# Patient Record
Sex: FEMALE | Race: WHITE
[De-identification: names, ages, dates, MRNs, and addresses within clinical notes are randomized per-mention and may not be internally consistent; named-entity substitution may affect disease eponyms.]

---

## 2017-04-24 ENCOUNTER — HOSPITAL ENCOUNTER (OUTPATIENT)
Dept: HOSPITAL 53 - M LABDRAW1 | Age: 82
End: 2017-04-24
Attending: FAMILY MEDICINE
Payer: MEDICARE

## 2017-04-24 DIAGNOSIS — R05: Primary | ICD-10-CM

## 2017-04-24 LAB
ALBUMIN SERPL BCG-MCNC: 2.8 GM/DL (ref 3.2–5.2)
ALBUMIN/GLOB SERPL: 0.7 {RATIO} (ref 1–1.93)
ALP SERPL-CCNC: 111 U/L (ref 45–117)
ALT SERPL W P-5'-P-CCNC: 28 U/L (ref 12–78)
ANION GAP SERPL CALC-SCNC: 10 MEQ/L (ref 8–16)
AST SERPL-CCNC: 23 U/L (ref 15–37)
BASOPHILS # BLD AUTO: 0 K/MM3 (ref 0–0.2)
BASOPHILS NFR BLD AUTO: 0.2 % (ref 0–1)
BILIRUB SERPL-MCNC: 1.5 MG/DL (ref 0.2–1)
BUN SERPL-MCNC: 20 MG/DL (ref 7–18)
CALCIUM SERPL-MCNC: 8.3 MG/DL (ref 8.8–10.2)
CHLORIDE SERPL-SCNC: 93 MEQ/L (ref 98–107)
CO2 SERPL-SCNC: 27 MEQ/L (ref 21–32)
CREAT SERPL-MCNC: 0.58 MG/DL (ref 0.55–1.02)
EOSINOPHIL # BLD AUTO: 0 K/MM3 (ref 0–0.5)
EOSINOPHIL NFR BLD AUTO: 0.1 % (ref 0–3)
ERYTHROCYTE [DISTWIDTH] IN BLOOD BY AUTOMATED COUNT: 14.1 % (ref 11.5–14.5)
GFR SERPL CREATININE-BSD FRML MDRD: > 60 ML/MIN/{1.73_M2} (ref 32–?)
GLUCOSE SERPL-MCNC: 108 MG/DL (ref 83–110)
LARGE UNSTAINED CELL #: 0.1 K/MM3 (ref 0–0.4)
LARGE UNSTAINED CELL %: 0.6 % (ref 0–4)
LYMPHOCYTES # BLD AUTO: 1 K/MM3 (ref 1.5–4.5)
LYMPHOCYTES NFR BLD AUTO: 5.6 % (ref 24–44)
MCH RBC QN AUTO: 28.5 PG (ref 27–33)
MCHC RBC AUTO-ENTMCNC: 31.9 G/DL (ref 32–36.5)
MCV RBC AUTO: 89.1 FL (ref 80–96)
MONOCYTES # BLD AUTO: 1.1 K/MM3 (ref 0–0.8)
MONOCYTES NFR BLD AUTO: 6.4 % (ref 0–5)
NEUTROPHILS # BLD AUTO: 14.3 K/MM3 (ref 1.8–7.7)
NEUTROPHILS NFR BLD AUTO: 87.1 % (ref 36–66)
PLATELET # BLD AUTO: 321 K/MM3 (ref 150–450)
POTASSIUM SERPL-SCNC: 4.2 MEQ/L (ref 3.5–5.1)
PROT SERPL-MCNC: 6.8 GM/DL (ref 6.4–8.2)
SODIUM SERPL-SCNC: 130 MEQ/L (ref 136–145)
WBC # BLD AUTO: 16.4 K/MM3 (ref 4–10)

## 2017-04-26 ENCOUNTER — HOSPITAL ENCOUNTER (INPATIENT)
Dept: HOSPITAL 53 - M ED | Age: 82
LOS: 3 days | Discharge: HOME HEALTH | DRG: 293 | End: 2017-04-29
Attending: HOSPITALIST | Admitting: HOSPITALIST
Payer: MEDICARE

## 2017-04-26 VITALS — DIASTOLIC BLOOD PRESSURE: 62 MMHG | SYSTOLIC BLOOD PRESSURE: 115 MMHG

## 2017-04-26 VITALS — DIASTOLIC BLOOD PRESSURE: 57 MMHG | SYSTOLIC BLOOD PRESSURE: 120 MMHG

## 2017-04-26 VITALS — HEIGHT: 61 IN | WEIGHT: 105.6 LBS | BODY MASS INDEX: 19.94 KG/M2

## 2017-04-26 DIAGNOSIS — E78.5: ICD-10-CM

## 2017-04-26 DIAGNOSIS — Z88.8: ICD-10-CM

## 2017-04-26 DIAGNOSIS — E73.9: ICD-10-CM

## 2017-04-26 DIAGNOSIS — F03.90: ICD-10-CM

## 2017-04-26 DIAGNOSIS — I50.33: Primary | ICD-10-CM

## 2017-04-26 DIAGNOSIS — R01.1: ICD-10-CM

## 2017-04-26 DIAGNOSIS — Z87.891: ICD-10-CM

## 2017-04-26 DIAGNOSIS — Z79.82: ICD-10-CM

## 2017-04-26 DIAGNOSIS — Z79.899: ICD-10-CM

## 2017-04-26 DIAGNOSIS — I27.2: ICD-10-CM

## 2017-04-26 DIAGNOSIS — Z86.73: ICD-10-CM

## 2017-04-26 DIAGNOSIS — E03.9: ICD-10-CM

## 2017-04-26 DIAGNOSIS — K21.9: ICD-10-CM

## 2017-04-26 DIAGNOSIS — I08.0: ICD-10-CM

## 2017-04-26 DIAGNOSIS — H40.9: ICD-10-CM

## 2017-04-26 LAB
ALBUMIN SERPL BCG-MCNC: 2.5 GM/DL (ref 3.2–5.2)
ALBUMIN/GLOB SERPL: 0.57 {RATIO} (ref 1–1.93)
ALP SERPL-CCNC: 115 U/L (ref 45–117)
ALT SERPL W P-5'-P-CCNC: 47 U/L (ref 12–78)
ANION GAP SERPL CALC-SCNC: 6 MEQ/L (ref 8–16)
AST SERPL-CCNC: 42 U/L (ref 15–37)
BASE EXCESS BLDA CALC-SCNC: -0.3 MMOL/L (ref -2–2)
BASOPHILS # BLD AUTO: 0 K/MM3 (ref 0–0.2)
BASOPHILS NFR BLD AUTO: 0.2 % (ref 0–1)
BILIRUB CONJ SERPL-MCNC: 0.3 MG/DL (ref 0–0.2)
BILIRUB SERPL-MCNC: 1 MG/DL (ref 0.2–1)
BUN SERPL-MCNC: 15 MG/DL (ref 7–18)
CALCIUM SERPL-MCNC: 8.4 MG/DL (ref 8.8–10.2)
CHLORIDE SERPL-SCNC: 95 MEQ/L (ref 98–107)
CO2 BLDA CALC-SCNC: 23 MEQ/L (ref 23–31)
CO2 SERPL-SCNC: 30 MEQ/L (ref 21–32)
CREAT SERPL-MCNC: 0.7 MG/DL (ref 0.55–1.02)
EOSINOPHIL # BLD AUTO: 0 K/MM3 (ref 0–0.5)
EOSINOPHIL NFR BLD AUTO: 0.1 % (ref 0–3)
ERYTHROCYTE [DISTWIDTH] IN BLOOD BY AUTOMATED COUNT: 14 % (ref 11.5–14.5)
GFR SERPL CREATININE-BSD FRML MDRD: > 60 ML/MIN/{1.73_M2} (ref 32–?)
GLUCOSE SERPL-MCNC: 104 MG/DL (ref 83–110)
HCO3 BLDA-SCNC: 22.1 MEQ/L (ref 22–26)
HCO3 STD BLDA-SCNC: 24.2 MEQ/L (ref 22–26)
LARGE UNSTAINED CELL #: 0.1 K/MM3 (ref 0–0.4)
LARGE UNSTAINED CELL %: 0.8 % (ref 0–4)
LYMPHOCYTES # BLD AUTO: 1 K/MM3 (ref 1.5–4.5)
LYMPHOCYTES NFR BLD AUTO: 7.1 % (ref 24–44)
MCH RBC QN AUTO: 28.7 PG (ref 27–33)
MCHC RBC AUTO-ENTMCNC: 32.2 G/DL (ref 32–36.5)
MCV RBC AUTO: 89.1 FL (ref 80–96)
MONOCYTES # BLD AUTO: 0.7 K/MM3 (ref 0–0.8)
MONOCYTES NFR BLD AUTO: 5.6 % (ref 0–5)
NEUTROPHILS # BLD AUTO: 10.9 K/MM3 (ref 1.8–7.7)
NEUTROPHILS NFR BLD AUTO: 86.2 % (ref 36–66)
PCO2 BLDA: 30 MMHG (ref 35–45)
PH BLDA: 7.49 UNITS (ref 7.35–7.45)
PLATELET # BLD AUTO: 360 K/MM3 (ref 150–450)
PO2 BLDA: 91.7 MMHG (ref 75–100)
POTASSIUM SERPL-SCNC: 4.1 MEQ/L (ref 3.5–5.1)
PROT SERPL-MCNC: 6.9 GM/DL (ref 6.4–8.2)
SODIUM SERPL-SCNC: 131 MEQ/L (ref 136–145)
T4 SERPL-MCNC: 9.7 UG/DL (ref 4.5–12)
WBC # BLD AUTO: 12.6 K/MM3 (ref 4–10)

## 2017-04-26 RX ADMIN — BRIMONIDINE TARTRATE SCH DROP: 1 SOLUTION/ DROPS OPHTHALMIC at 22:09

## 2017-04-26 RX ADMIN — FUROSEMIDE SCH MG: 10 INJECTION, SOLUTION INTRAMUSCULAR; INTRAVENOUS at 17:17

## 2017-04-26 RX ADMIN — OMEPRAZOLE SCH MG: 20 CAPSULE, DELAYED RELEASE ORAL at 22:08

## 2017-04-26 RX ADMIN — FUROSEMIDE SCH MG: 10 INJECTION, SOLUTION INTRAMUSCULAR; INTRAVENOUS at 12:49

## 2017-04-26 RX ADMIN — DORZOLAMIDE HYDROCHLORIDE AND TIMOLOL MALEATE SCH DROP: 20; 5 SOLUTION/ DROPS OPHTHALMIC at 22:09

## 2017-04-26 RX ADMIN — SODIUM CHLORIDE SCH UNITS: 4.5 INJECTION, SOLUTION INTRAVENOUS at 22:09

## 2017-04-26 RX ADMIN — SODIUM CHLORIDE SCH UNITS: 4.5 INJECTION, SOLUTION INTRAVENOUS at 14:15

## 2017-04-26 RX ADMIN — LATANOPROST SCH DROP: 50 SOLUTION OPHTHALMIC at 22:09

## 2017-04-26 NOTE — REP
PORTABLE CHEST:

 

Single AP view of the chest is performed.

 

Comparison 04/24/2017 and 12/11/2015.  There is mild cardiomegaly.  There does

appear to be vascular congestion.  There are diffuse increased interstitial

markings bilaterally suggesting interstitial edema and/or fibrosis.  No

consolidation is seen.  Findings are similar to the prior study of 12/11/2015.

There is calcification of the thoracic aorta.  The mediastinal silhouette is not

definitely changed.

 

IMPRESSION:

 

Cardiomegaly with vascular congestion and diffuse interstitial edema and/or

fibrosis.  Findings are similar to the prior exam of 12/11/2015.

 

 

Signed by

Oswaldo Harvey MD 04/27/2017 04:40 P

## 2017-04-26 NOTE — HPE
DATE OF ADMISSION:  04/26/2017

 

PRIMARY CARE PROVIDER:  Brandie Maguire

 

CODE STATUS:  FULL CODE.

 

Healthcare proxy is Rosmery Justin.

 

CHIEF COMPLAINT:  Dyspnea on exertion.

 

HISTORY OF PRESENT ILLNESS:  87-year-old female presents to the emergency

department with worsening symptoms of shortness of breath, dyspnea on exertion,

chest congestion, and has intermittent productive cough with no fevers, chills,

or rigors for the last week or so.  She was seen and treated by her primary care

provider for lower respiratory infection, has been on Levaquin which has not

seemed to have helped her symptoms much, and with presentation to the emergency

department she was given a nebulizer which did little to help out.  She is on

supplemental oxygen.  BNP is elevated, and her chest x-ray does have vascular

cephalization, congestion suggestive of heart failure.  At any rate, she does

appear to be too weak to return home with generalized weakness and fall risk, 
and

hospitalist was called for consult.  Additionally, the emergency room (ER)

physician did relate to me that there appears to be a new murmur which has not

been recorded anywhere.  She had a 2D echo done in 2006, which showed 70% left

ventricular ejection fraction, moderate aortic valve sclerosis, moderate mitral

valve annular calcification with marginal inflow tract obstruction and trace

insufficiency.  She has had lower extremity edema, dyspnea on exertion,

paroxysmal nocturnal dyspnea (PND), orthopnea.  The majority of the information

was gathered from the family since she does have some issues with mild cognitive

dysfunction and what appears to be some dementia.

 

PAST MEDICAL HISTORY:

Cataracts.

Gastroesophageal reflux disease (GERD).

Glaucoma.

Hyperlipidemia.

Hypothyroidism.

Lactose intolerance.

Spinal stenosis.

Transient ischemic attack (TIA).

Cognitive impairment, likely early dementia.

 

PAST SURGICAL HISTORY:

Eye surgery.

Cholecystectomy.

Cataract surgery, bilaterally.

Appendectomy.

Pleurectomy, bilaterally.

 

SOCIAL HISTORY:  She is a former smoker.  She lives with her son and

daughter-in-law.  Denies any alcohol use, drug use.  No recent travel.  No sick

contacts.

 

FAMILY HISTORY:  Noncontributory due to advanced age.

 

ALLERGIES:  Are to LYRICA.

 

HOME MEDICATIONS:

-  aspirin 81 mg daily

-  Tessalon Perles 100 mg three times a day as needed, cough

-  Dulcolax suppository as needed

-  brimonidine ophthalmic drops 1 drop both eyes twice a day

-  Os-Mejia D daily

-  dorzolamide/Timolol eye drops

-  latanoprost 0.005% ophthalmic solution 1 drop both eyes nightly

-  Synthroid 25 mcg daily

-  multivitamin one tablet daily

-  Prilosec 20 mg twice a day

 

REVIEW OF SYSTEMS:

CONSTITUTIONAL:  No fevers, chills, rigors.  She has had generalized weakness

which has been progressively worse for the last week or so with lower extremity

edema and decreased appetite but no weight loss.

HEENT:  No headache, lightheaded, dizziness.  No blurry vision, double vision,

tinnitus.  No facial droop.  No slurred speech.  No difficulty with swallowing.

 

PULMONARY:  Dyspnea on exertion, intermittent productive cough, recently treated

for lower respiratory infection.  No hemoptysis.

CARDIOVASCULAR:  She denies chest pain, but she has had some chest tightness,

difficulty with breathing, has PND, orthopnea, and lower extremity edema, but no

chest pain or palpitations.

GASTROINTESTINAL (GI):  No nausea, vomiting, diarrhea.  She does have decreased

appetite for the last week or so according to the family.  Bowel movements are

regular.  No hematochezia or melena.

GENITOURINARY ():  No dysuria, frequency, hematuria.

MUSCULOSKELETAL:  No bone, muscle, or joint pain, swelling, erythema, but

generalized weakness is again noted.

NEUROLOGIC:  No paresthesias or paralysis.  She does have a prior history of TIA

with no deficits.

PSYCHIATRIC:  No history of depression.  No suicidal ideation.  No audiovisual

hallucinations.

SKIN:  No skin lesions or abrasions.  No breakdown.

12-point review of systems complete.  Pertinent positives are listed.

 

PHYSICAL EXAMINATION:

Temperature is 97.7, pulse 86 and regular, respiratory rate 17, blood pressure

116/60, SPO2 is 92% currently on room air.

GENERAL:  The patient appears to be in no acute distress.  She is alert,

pleasant, has some mild cognitive impairment with some mild confusion.

Otherwise, she is pleasant talk to.

HEENT:  Unremarkable.  She did have some jugular venous distention (JVD) noted,

approximately 3 cm.

HEART:  Regular rate.  She does have a pansystolic murmur noted along both sides

of the lower sternal border.

LUNG EXAM:  She does have diminished bibasilar breath sounds with crackles and

expiratory wheeze noted.

ABDOMEN:  Soft, nontender, nondistended.  Positive bowel sounds.

No masses.  No rebound.

EXTREMITIES:  She does have 2+ pitting edema at the ankles and the dorsum of the

feet.  Otherwise, pulses are intact.  Sensation is intact.

 

LABORATORY DATA:  White count 12.6, hemoglobin 13.1, platelets 360,000.  Sodium

131, potassium 4.1, chloride 95, bicarb 30, anion gap 6, BUN is 15, creatinine

0.70, glucose is 104, calcium 8.4, lactic acid is 1.6, AST 42, ALT is 47,

alkaline phosphatase is 115, total bilirubin 1.0, CK 30, CK-MB 1.3, troponin 
less

than 0.02.  BNP is 690.  Albumin 2.5.  TSH 2.750.  ABG shows pH of 7.485 and 
pCO2

of 30.  Influenza A and B are negative.  Blood cultures are pending times two.

 

Chest x-ray:  Cardiomegaly noted with vascular congestion and cephalization.

Diffuse interstitial edema versus fibrosis.  Findings similar to prior exam on

12/11/2015.

 

IMPRESSION:

Ms. Justin is an 87-year-old female with worsening symptoms of dyspnea on

exertion, shortness of breath, intermittent productive sputum, and wheeze and

appears to be in an acute exacerbation of congestive heart failure.  She does

have a pretty significant pansystolic murmur that I do not find documented

anywhere on her electronic medical record (EMR), and this will need further

workup as well.

 

PROBLEM LIST:

1.  Acute exacerbation of congestive heart failure (CHF).

2.  Pansystolic murmur, history of moderate aortic valve sclerosis with moderate

mitral valve annular calcification.  Marginal inflow tract obstruction with 
trace

insufficiency.

3.  Dementia with mild cognitive impairment.

4.  Gastroesophageal reflux disease (GERD).

5.  Hyperlipidemia.

6.  Hypothyroidism.

7.  Spinal stenosis.

8.  Cataracts and glaucoma.

9.  History of transient ischemic attack (TIA).

 

PLAN:  The patient will be admitted to progressive care unit (PCU) on telemetry

per Dr. Aguilar's service.  Will repeat a 2D echo.  Gentle diuresis with fluid

restriction.  Will cycle her cardiac marker panel.  Monitor strict intake and

output (I's and O's), daily weight, and monitor renal function.  Continue her

home medications.  Deep venous thrombosis (DVT) prophylaxis with subcu heparin.

 

DISPOSITION:  Prognosis is guarded due to significant pansystolic murmur with

associated congestive heart failure.  Did have a discussion with patient's 
family

members, specifically, her son regarding advance directives, and currently she

has a healthcare proxy in place, but no further discussions have been made

regarding advance directives.  I would like to see how she does with her 2D echo

in response to diuresis and will reevaluate what the goals of care are tomorrow.

VIKTORIYA

## 2017-04-27 VITALS — DIASTOLIC BLOOD PRESSURE: 42 MMHG | SYSTOLIC BLOOD PRESSURE: 95 MMHG

## 2017-04-27 VITALS — SYSTOLIC BLOOD PRESSURE: 96 MMHG | DIASTOLIC BLOOD PRESSURE: 50 MMHG

## 2017-04-27 VITALS — DIASTOLIC BLOOD PRESSURE: 52 MMHG | SYSTOLIC BLOOD PRESSURE: 90 MMHG

## 2017-04-27 VITALS — DIASTOLIC BLOOD PRESSURE: 51 MMHG | SYSTOLIC BLOOD PRESSURE: 104 MMHG

## 2017-04-27 VITALS — SYSTOLIC BLOOD PRESSURE: 83 MMHG | DIASTOLIC BLOOD PRESSURE: 48 MMHG

## 2017-04-27 VITALS — DIASTOLIC BLOOD PRESSURE: 66 MMHG | SYSTOLIC BLOOD PRESSURE: 138 MMHG

## 2017-04-27 VITALS — SYSTOLIC BLOOD PRESSURE: 89 MMHG | DIASTOLIC BLOOD PRESSURE: 50 MMHG

## 2017-04-27 VITALS — DIASTOLIC BLOOD PRESSURE: 51 MMHG | SYSTOLIC BLOOD PRESSURE: 95 MMHG

## 2017-04-27 LAB
ALBUMIN SERPL BCG-MCNC: 2 GM/DL (ref 3.2–5.2)
ANION GAP SERPL CALC-SCNC: 8 MEQ/L (ref 8–16)
BUN SERPL-MCNC: 23 MG/DL (ref 7–18)
CALCIUM SERPL-MCNC: 8 MG/DL (ref 8.8–10.2)
CHLORIDE SERPL-SCNC: 97 MEQ/L (ref 98–107)
CO2 SERPL-SCNC: 30 MEQ/L (ref 21–32)
CREAT SERPL-MCNC: 0.59 MG/DL (ref 0.55–1.02)
ERYTHROCYTE [DISTWIDTH] IN BLOOD BY AUTOMATED COUNT: 14 % (ref 11.5–14.5)
GFR SERPL CREATININE-BSD FRML MDRD: > 60 ML/MIN/{1.73_M2} (ref 32–?)
GLUCOSE SERPL-MCNC: 154 MG/DL (ref 83–110)
MCH RBC QN AUTO: 28.5 PG (ref 27–33)
MCHC RBC AUTO-ENTMCNC: 32.5 G/DL (ref 32–36.5)
MCV RBC AUTO: 87.6 FL (ref 80–96)
PHOSPHATE SERPL-MCNC: 4 MG/DL (ref 2.5–4.9)
PLATELET # BLD AUTO: 301 K/MM3 (ref 150–450)
POTASSIUM SERPL-SCNC: 3.6 MEQ/L (ref 3.5–5.1)
SODIUM SERPL-SCNC: 135 MEQ/L (ref 136–145)
WBC # BLD AUTO: 9.6 K/MM3 (ref 4–10)

## 2017-04-27 RX ADMIN — LEVOTHYROXINE SODIUM SCH MG: 25 TABLET ORAL at 05:56

## 2017-04-27 RX ADMIN — DORZOLAMIDE HYDROCHLORIDE AND TIMOLOL MALEATE SCH DROP: 20; 5 SOLUTION/ DROPS OPHTHALMIC at 20:33

## 2017-04-27 RX ADMIN — MULTIPLE VITAMINS W/ MINERALS TAB SCH TAB: TAB at 08:48

## 2017-04-27 RX ADMIN — OMEPRAZOLE SCH MG: 20 CAPSULE, DELAYED RELEASE ORAL at 08:47

## 2017-04-27 RX ADMIN — DORZOLAMIDE HYDROCHLORIDE AND TIMOLOL MALEATE SCH DROP: 20; 5 SOLUTION/ DROPS OPHTHALMIC at 08:51

## 2017-04-27 RX ADMIN — Medication SCH MG: at 08:47

## 2017-04-27 RX ADMIN — LATANOPROST SCH DROP: 50 SOLUTION OPHTHALMIC at 20:34

## 2017-04-27 RX ADMIN — ASPIRIN SCH MG: 81 TABLET ORAL at 08:48

## 2017-04-27 RX ADMIN — BRIMONIDINE TARTRATE SCH DROP: 1 SOLUTION/ DROPS OPHTHALMIC at 08:51

## 2017-04-27 RX ADMIN — SODIUM CHLORIDE SCH UNITS: 4.5 INJECTION, SOLUTION INTRAVENOUS at 20:33

## 2017-04-27 RX ADMIN — SODIUM CHLORIDE SCH UNITS: 4.5 INJECTION, SOLUTION INTRAVENOUS at 05:56

## 2017-04-27 RX ADMIN — FUROSEMIDE SCH MG: 10 INJECTION, SOLUTION INTRAMUSCULAR; INTRAVENOUS at 08:51

## 2017-04-27 RX ADMIN — OMEPRAZOLE SCH MG: 20 CAPSULE, DELAYED RELEASE ORAL at 20:33

## 2017-04-27 RX ADMIN — SODIUM CHLORIDE SCH UNITS: 4.5 INJECTION, SOLUTION INTRAVENOUS at 14:00

## 2017-04-27 RX ADMIN — BRIMONIDINE TARTRATE SCH DROP: 1 SOLUTION/ DROPS OPHTHALMIC at 20:33

## 2017-04-27 NOTE — ECHO
DATE OF PROCEDURE:04/27/2017

 

REFERRING PHYSICIAN: Elan Aguilar DO

 

INDICATION: Heart murmur.

 

HEIGHT: 155 cm

WEIGHT: 49 kg

 

DIMENSIONS:

IVS: 1.0

LV: 4.9

LVPW: 1.1

LA: 4.3

Aorta: 2.7

 

FINDINGS: The study is of good technical quality.  Left ventricle is of normal

size and hyperdynamic contractility with estimated ejection fraction (EF)

approximately 70-75%. Right ventricle does not appear grossly enlarged. Left

atrium is severely enlarged. Right atrium is probably normal size. Aortic valve

is heavily calcific. I cannot comment on its structure but by 2-D imaging I

suspect severe aortic stenosis.  There are also heavy degenerative abnormalities

of mitral valve with very prominent mitral annular calcifications and thickening

of mitral leaflets. Cannot rule out rheumatic process. Tricuspid and pulmonic

valves appear normal.  No pericardial effusion is noted.  Inferior vena cava is

dilated and there is no appreciable collapse with respiration indicative of

likely very high central venous pressure.  Aortic root is normal. Aortic arch 
was

not well seen. Abdominal aorta appears normal.

 

Doppler interrogation of aortic valve reveals mild insufficiency and probably

severe stenosis, even though the measured mean gradient is only 27 mmHg.

Calculated aortic valve area was only 0.4 cm2 which is likely inaccurate.  There

is severe mitral insufficiency with mitral regurgitation (MR) jet oriented

towards the roof of left atrium and moderate stenosis (mean gradient 6mmHg).

There is mild or mild-to-moderate tricuspid insufficiency with calculated 
pulmonary 

artery pressure in 60's corresponding to moderately severe pulmonary 
hypertension.  

Pulmonic valve exhibits mild insufficiency.

 

Mitral inflow pattern and tissue Doppler imaging of mitral annulus reveal grade 
2

diastolic dysfunction (E velocity on mitral inflow is 211 cm/sec, E prime

velocity septal 5.6 and lateral 4.7 cm/sec).

 

CONCLUSIONS:

1.  Study is of good technical quality.

2.  Normal left ventricle (LV) size with hyperdynamic LV systolic function and

grade 2 diastolic dysfunction.

3.  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

mild-to-moderate stenosis (mean gradient 6 mmHg, peak gradient 18 mmHg).

4.  Probably severe aortic stenosis (mean gradient 27 mmHg, but poor quality

measurement).

5.  Moderately severe pulmonary hypertension.

6.  Very high central venous pressure under

 

COMMENT: Subacute bacterial endocarditis (SBE) prophylaxis is not recommended.

If the patient is still considered a surgical candidate then she should be

evaluated for possibility of aortic and mitral valve surgery.

[facility]

## 2017-04-27 NOTE — IPN
DATE:  04/27/2017

 

An 87-year-old female seen at bedside eating her breakfast.  No overnight issues

reported.  She feels that she is breathing easier and objectively, she does

appear to be not struggling to catch her breath.  Her daughter-in-law is present

at bedside.  She does appear to be pleased with how her mother has progressed

since yesterday.

 

OBJECTIVE:

Temperature is 98.5, pulse 80, respiratory rate is 20, blood pressure is 104/51,

SPO2 is 96% on room air.

GENERAL:  The patient appears to be in no acute distress.  She is alert and

oriented.

HEENT:  Unremarkable.  Jugular venous distention (JVD) unappreciated.

LUNGS:  Decreased bibasilar breath sounds, otherwise clear.

HEART:  Regular rate and rhythm with pansystolic murmur at the bottom bilateral

sternal borders.

ABDOMEN:  Soft.

EXTREMITIES:  No edema.  No calf tenderness.

 

LABORATORY DATA:  White count is 9.6, hemoglobin 11.4, and platelets 301.

 

Sodium 135 up from 131, potassium 3.6, chloride 97, bicarbonate 30, anion gap 8,

BUN is 23, creatinine 0.59, glucose is 154, calcium 8.0, phosphorus 4.0, troponin

is less than 0.02 on three occasions.

 

Influenza A and B are negative.  Blood cultures pending times two for 24 hours.

 

 

A 2D echocardiogram is pending at this time.

 

ASSESSMENT AND PLAN:

1.  Pansystolic murmur which appears to be new for this patient.  A 2D

echocardiogram is pending.

 

2.  Acute exacerbation of congestive heart failure.  She does appear to be doing

well with diuresing.  We will continue with fluid restriction and plan on

switching her over to oral Lasix.

 

3.  Dementia with mild cognitive impairment, stable.

 

4.  Gastroesophageal reflux disease (GERD), stable.

 

5.  Hyperlipidemia, stable.

 

6.  Hypothyroidism, stable.

 

7.  History of spinal stenosis.  No current issues.

 

8.  Cataracts and glaucoma, stable.

 

9.  History of transient ischemic attacks (TIAs).

 

DISPOSITION:  We will see how she does over the next 24-48 hours.  Check the 2D

echocardiogram.  If she is doing well either tomorrow or the next day on Lasix

dosing, we will consider discharging her home.  She may not need any assistance

from public health since she does live at home with her son and daughter-in-law

and they do appear to have a good support network.

## 2017-04-27 NOTE — ECGEPIP
Stationary ECG Study

                           [facility] - ED

                                       

                                       Test Date:    2017

Pat Name:     ROBERT LOWRY            Department:   

Patient ID:   [id number]                 Room:         [place] [address]

Gender:       F                        Technician:   EFREM

:          1930               Requested By: Marc PEARL

Order Number: [id number]     Reading MD:   Katharine Raines

                                 Measurements

Intervals                              Axis          

Rate:         87                       P:            70

NM:           160                      QRS:          -9

QRSD:         86                       T:            60

QT:           361                                    

QTc:          436                                    

                           Interpretive Statements

SINUS RHYTHM

MINIMAL VOLTAGE CRITERIA FOR LVH, CONSIDER NORMAL VARIANT

SIMILAR 12/11/15

Electronically Signed On 2017 9:14:11 EDT by Katharine Raines

## 2017-04-28 VITALS — DIASTOLIC BLOOD PRESSURE: 66 MMHG | SYSTOLIC BLOOD PRESSURE: 108 MMHG

## 2017-04-28 VITALS — SYSTOLIC BLOOD PRESSURE: 110 MMHG | DIASTOLIC BLOOD PRESSURE: 58 MMHG

## 2017-04-28 VITALS — SYSTOLIC BLOOD PRESSURE: 111 MMHG | DIASTOLIC BLOOD PRESSURE: 56 MMHG

## 2017-04-28 VITALS — SYSTOLIC BLOOD PRESSURE: 105 MMHG | DIASTOLIC BLOOD PRESSURE: 55 MMHG

## 2017-04-28 LAB
ALBUMIN SERPL BCG-MCNC: 2 GM/DL (ref 3.2–5.2)
ANION GAP SERPL CALC-SCNC: 6 MEQ/L (ref 8–16)
BUN SERPL-MCNC: 26 MG/DL (ref 7–18)
CALCIUM SERPL-MCNC: 8.1 MG/DL (ref 8.8–10.2)
CHLORIDE SERPL-SCNC: 98 MEQ/L (ref 98–107)
CO2 SERPL-SCNC: 32 MEQ/L (ref 21–32)
CREAT SERPL-MCNC: 0.62 MG/DL (ref 0.55–1.02)
ERYTHROCYTE [DISTWIDTH] IN BLOOD BY AUTOMATED COUNT: 14 % (ref 11.5–14.5)
GFR SERPL CREATININE-BSD FRML MDRD: > 60 ML/MIN/{1.73_M2} (ref 32–?)
GLUCOSE SERPL-MCNC: 85 MG/DL (ref 83–110)
MCH RBC QN AUTO: 30.7 PG (ref 27–33)
MCHC RBC AUTO-ENTMCNC: 34.5 G/DL (ref 32–36.5)
MCV RBC AUTO: 89 FL (ref 80–96)
PHOSPHATE SERPL-MCNC: 2.9 MG/DL (ref 2.5–4.9)
PLATELET # BLD AUTO: 352 K/MM3 (ref 150–450)
POTASSIUM SERPL-SCNC: 3.7 MEQ/L (ref 3.5–5.1)
SODIUM SERPL-SCNC: 136 MEQ/L (ref 136–145)
WBC # BLD AUTO: 12.2 K/MM3 (ref 4–10)

## 2017-04-28 RX ADMIN — FUROSEMIDE SCH MG: 20 TABLET ORAL at 09:45

## 2017-04-28 RX ADMIN — SODIUM CHLORIDE SCH UNITS: 4.5 INJECTION, SOLUTION INTRAVENOUS at 20:56

## 2017-04-28 RX ADMIN — SODIUM CHLORIDE SCH UNITS: 4.5 INJECTION, SOLUTION INTRAVENOUS at 05:48

## 2017-04-28 RX ADMIN — DOXYCYCLINE HYCLATE SCH MG: 100 TABLET, COATED ORAL at 20:56

## 2017-04-28 RX ADMIN — MULTIPLE VITAMINS W/ MINERALS TAB SCH TAB: TAB at 09:44

## 2017-04-28 RX ADMIN — DOXYCYCLINE HYCLATE SCH MG: 100 TABLET, COATED ORAL at 09:45

## 2017-04-28 RX ADMIN — ASPIRIN SCH MG: 81 TABLET ORAL at 09:45

## 2017-04-28 RX ADMIN — OMEPRAZOLE SCH MG: 20 CAPSULE, DELAYED RELEASE ORAL at 20:56

## 2017-04-28 RX ADMIN — DORZOLAMIDE HYDROCHLORIDE AND TIMOLOL MALEATE SCH DROP: 20; 5 SOLUTION/ DROPS OPHTHALMIC at 09:46

## 2017-04-28 RX ADMIN — LATANOPROST SCH DROP: 50 SOLUTION OPHTHALMIC at 20:55

## 2017-04-28 RX ADMIN — DORZOLAMIDE HYDROCHLORIDE AND TIMOLOL MALEATE SCH DROP: 20; 5 SOLUTION/ DROPS OPHTHALMIC at 20:55

## 2017-04-28 RX ADMIN — BRIMONIDINE TARTRATE SCH DROP: 1 SOLUTION/ DROPS OPHTHALMIC at 09:46

## 2017-04-28 RX ADMIN — LEVOTHYROXINE SODIUM SCH MG: 25 TABLET ORAL at 05:48

## 2017-04-28 RX ADMIN — SODIUM CHLORIDE SCH UNITS: 4.5 INJECTION, SOLUTION INTRAVENOUS at 14:39

## 2017-04-28 RX ADMIN — Medication SCH MG: at 09:45

## 2017-04-28 RX ADMIN — OMEPRAZOLE SCH MG: 20 CAPSULE, DELAYED RELEASE ORAL at 09:45

## 2017-04-28 RX ADMIN — BRIMONIDINE TARTRATE SCH DROP: 1 SOLUTION/ DROPS OPHTHALMIC at 20:55

## 2017-04-28 NOTE — IPN
DATE: 04/28/2017

 

87-year-old female seen at bedside resting comfortably.  She has diuresed well.

She is on room air.  Currently her daughter is present at bedside as well to go

over the findings of her echo. Overnight there were no issues.  No chest pain,

nausea, vomiting.  She is tolerating her morning breakfast.

 

OBJECTIVE:

Temperature is 90.1, pulse 83, respiratory rate is 20, /56, SPO2 is 97% on

room air.  General:  The patient appears to be in no acute distress.  Is alert

and oriented.  HEENT:  Unremarkable.  Lungs:  Clear auscultation bilaterally.

Heart:  Regular rate and rhythm.  Pansystolic murmur at the lower sternal border

bilaterally.  Abdomen:  Soft.  Extremities:  No edema or calf tenderness.

 

LABORATORY DATA

White count 12.2, hemoglobin 0.5, platelets 352,000.  Sodium 136, potassium 3.7,

chloride 90, bicarb 37, anion gap 6, BUN is 26, creatinine 0.62, glucose 85,

albumin 2.0.  Influenza A and B are negative.

 

Blood cultures negative for 48 hours times two.

 

ASSESSMENT/PLAN:

1.   Pansystolic murmur with the following 2-D left      <<1:33>>      ejection

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

insufficiency and mild to moderate stenosis, probable severe aortic stenosis,

moderately severe pulmonary hypertension.  She is diuresing well and

symptomatically she is much improved.

 

2.   Acute exacerbation of congestive heart failure.  Again will continue on oral

Lasix with fluid restriction.

 

3. Dementia with mild cognitive impairment. Stable.

 

4.  Gastroesophageal reflux disease. Stable.

 

5.   Hyperlipidemia.  Stable.

 

6.   Hypothyroidism, stable.

 

7.   History of spinal stenosis.  No limitations currently.

 

8.   Cataracts and glaucoma.  Stable.

 

9.   History of TIAs.  No deficits.

 

DISPOSITION:

I did have a lengthy discussion with the patient's daughter-in-law, Rosmery, who is

her healthcare proxy.  We did have a discussion regarding her long-term

prognosis, which is poor and the risk of surgical repair of a bivalvular heart

disease.  I did discuss with Dr. Brown, who has agreed to see the patient as an

outpatient as well.  I would like to keep her one more night as we adjust her

Lasix.  Anticipate home discharge tomorrow.

## 2017-04-29 VITALS — SYSTOLIC BLOOD PRESSURE: 118 MMHG | DIASTOLIC BLOOD PRESSURE: 61 MMHG

## 2017-04-29 LAB
ALBUMIN SERPL BCG-MCNC: 2.2 GM/DL (ref 3.2–5.2)
ANION GAP SERPL CALC-SCNC: 8 MEQ/L (ref 8–16)
BUN SERPL-MCNC: 22 MG/DL (ref 7–18)
CALCIUM SERPL-MCNC: 7.9 MG/DL (ref 8.8–10.2)
CHLORIDE SERPL-SCNC: 100 MEQ/L (ref 98–107)
CO2 SERPL-SCNC: 29 MEQ/L (ref 21–32)
CREAT SERPL-MCNC: 0.56 MG/DL (ref 0.55–1.02)
ERYTHROCYTE [DISTWIDTH] IN BLOOD BY AUTOMATED COUNT: 14 % (ref 11.5–14.5)
GFR SERPL CREATININE-BSD FRML MDRD: > 60 ML/MIN/{1.73_M2} (ref 32–?)
GLUCOSE SERPL-MCNC: 96 MG/DL (ref 83–110)
MCH RBC QN AUTO: 28.7 PG (ref 27–33)
MCHC RBC AUTO-ENTMCNC: 32.2 G/DL (ref 32–36.5)
MCV RBC AUTO: 89.2 FL (ref 80–96)
PHOSPHATE SERPL-MCNC: 3 MG/DL (ref 2.5–4.9)
PLATELET # BLD AUTO: 418 K/MM3 (ref 150–450)
POTASSIUM SERPL-SCNC: 3.7 MEQ/L (ref 3.5–5.1)
SODIUM SERPL-SCNC: 137 MEQ/L (ref 136–145)
WBC # BLD AUTO: 8.6 K/MM3 (ref 4–10)

## 2017-04-29 RX ADMIN — DOXYCYCLINE HYCLATE SCH MG: 100 TABLET, COATED ORAL at 09:44

## 2017-04-29 RX ADMIN — ASPIRIN SCH MG: 81 TABLET ORAL at 09:44

## 2017-04-29 RX ADMIN — Medication SCH MG: at 09:43

## 2017-04-29 RX ADMIN — OMEPRAZOLE SCH MG: 20 CAPSULE, DELAYED RELEASE ORAL at 09:43

## 2017-04-29 RX ADMIN — MULTIPLE VITAMINS W/ MINERALS TAB SCH TAB: TAB at 09:44

## 2017-04-29 RX ADMIN — DORZOLAMIDE HYDROCHLORIDE AND TIMOLOL MALEATE SCH DROP: 20; 5 SOLUTION/ DROPS OPHTHALMIC at 09:44

## 2017-04-29 RX ADMIN — SODIUM CHLORIDE SCH UNITS: 4.5 INJECTION, SOLUTION INTRAVENOUS at 06:20

## 2017-04-29 RX ADMIN — FUROSEMIDE SCH MG: 20 TABLET ORAL at 09:43

## 2017-04-29 RX ADMIN — BRIMONIDINE TARTRATE SCH DROP: 1 SOLUTION/ DROPS OPHTHALMIC at 09:44

## 2017-04-29 RX ADMIN — LEVOTHYROXINE SODIUM SCH MG: 25 TABLET ORAL at 06:20

## 2017-04-29 NOTE — DSES
DATE OF ADMISSION:  04/26/2017

DATE OF DISCHARGE:  04/29/2017

 

PRIMARY CARE PROVIDER:  Brandie Maguire MD

 

CODE STATUS:  FULL CODE.

 

CONSULTATIONS:  None.

 

PROCEDURES:  None.

 

COMPLICATIONS:  None.

 

ADMISSION/DISCHARGE DIAGNOSES:

1.  Dyspnea on exertion with pulmonary edema and congestive heart failure.

2.  Pansystolic murmur along with rheumatic mitral valve disease with severe

insufficiency and mild to moderate stenosis.

3.  Severe aortic stenosis.

4.  Congestive heart failure with diastolic dysfunction.

5.  Pulmonary hypertension.

6.  Dementia with mild cognitive impairment.

7.  Gastroesophageal reflux disease (GERD).

8.  Hyperlipidemia.

9.  Hypothyroidism.

10.  History of spinal stenosis.

11.  Cataracts and glaucoma.

12.  Prior history of transient ischemic attacks (TIAs).

 

BRIEF HOSPITAL COURSE:  Ms. Justin is an 87-year-old female who presented to the

emergency department on 04/26/2017 after having increasing shortness of breath

and dyspnea on exertion.  Initially, productive cough that had changed to a

nonproductive cough and questionable fevers, approximately a week or so ago.  She

was initially treated with Levaquin for a lower respiratory infection and her

symptomatology has continued to get worse and the patient thought that she should

have further evaluation through the emergency department.  While there, she was

noted to have some lower extremity edema, bilateral wheeze and crackles on lung

examination.  Jugular venous distention (JVD) was noted and a pansystolic heart

murmur was noted that we were unable to confirm by prior records and the EMR.

 

She was admitted for further diuresing due to the clinical picture of volume

overload.  Her white count did slightly spike.  She had a low grade temperature

increase and we start her on doxycycline as well.  She has diuresed well.  We did

have a discussion with family regarding Lasix, fluid restriction, and we went

over her findings on her 2D echo.  Her 2D echo on 04/27/2017 read by Dr. Brown

showed a left ventricular ejection fraction of 70-75%.  She had normal left

ventricular size, hyperdynamic left ventricular systolic function and grade 2

diastolic dysfunction.  She did have findings suggestive of rheumatic mitral

valve disease with severe insufficiency and mild to moderate stenosis with a mean

gradient of 6 mmHg, peak gradient 18 mmHg, and probable severe aortic stenosis

with mean gradient of 27 mmHg, but poor quality measurement.  Moderately severe

pulmonary hypertension was noted and very high central venous pressure.  At any

rate, she responded to therapy well and was felt to be at baseline.

 

Examination today temperature is 97.9, pulse 85, respiratory rate is 19, blood

pressure 118/61, SpO2 92% on room air.

GENERAL:  She is alert, but pleasantly confused.

HEENT:  Unremarkable.  Throat clear.

LUNGS:  Diminished bibasilar breath sounds, otherwise clear.

HEART:  Regular rate and rhythm.

ABDOMEN:  Soft.

EXTREMITIES:  No edema.  No calf tenderness.

 

LABORATORY DATA:  White count 8.6, hemoglobin 12.3, and platelets 418,000.

Sodium 137, potassium 3.7, chloride 100, bicarb 29, anion gap 8, BUN is 22,

creatinine 0.56, glucose 96, calcium 7.9, phosphorus 3.0, albumin is 2.2.

 

Her cardiac enzymes remained negative and she is not demonstrating any acute

changes on her EKG.   Blood cultures were negative times two.  Influenza A and B

are negative.  Chest x-ray done on admission did show cardiomegaly with vascular

congestion and diffuse interstitial edema versus fibrosis.

 

DISCHARGE CONDITION:  Good.

 

DISPOSITION:  Discharged to home.

 

DISCHARGE MEDICATIONS:

- Lasix 20 mg daily

- doxycycline 100 mg twice a day for seven more days

- aspirin 81 mg daily

- Tessalon Perles 100 mg three times a day as needed

- Bepreve 1.5% one drop each eye daily

- Alphagan one drop each eye twice a day

- calcium with vitamin D one tablet daily

- dorzolamide/timolol one drop both eyes twice a day

- Lactaid one tablet at bedtime

- Xalatan one drop each eye at bedtime

- Synthroid 25 mcg daily

- multivitamin one tablet daily

- omeprazole 20 mg twice a day

 

DISCHARGE INSTRUCTIONS:

Discharge to home.  Activity as tolerated. Regular diet.  1800 mL fluid

restriction per day.  Continue on Lasix daily.  She should see Dr. Brown in the

next 1-2 weeks for further discussion regarding prognosis; however, having the

fact that she has two valves involved with significant sclerosis and stenosis,

she does have a poor long term prognosis.  At any rate, I will defer this to Dr. Brown to discuss further with her and her family as an outpatient.  See Brandie Maguire in a week.  Seek medical attention should symptoms worsen or progress.

We also did fill out paperwork for her to have public health and she will return

home to the care of her son and daughter-in-law.

 

Discharge took 35 minutes.

## 2017-05-08 ENCOUNTER — HOSPITAL ENCOUNTER (OUTPATIENT)
Dept: HOSPITAL 53 - M SHH | Age: 82
End: 2017-05-08
Attending: FAMILY MEDICINE
Payer: MEDICARE

## 2017-05-08 DIAGNOSIS — I50.32: Primary | ICD-10-CM

## 2017-05-08 LAB
ANION GAP SERPL CALC-SCNC: 5 MEQ/L (ref 8–16)
BUN SERPL-MCNC: 22 MG/DL (ref 7–18)
CALCIUM SERPL-MCNC: 8.8 MG/DL (ref 8.8–10.2)
CHLORIDE SERPL-SCNC: 98 MEQ/L (ref 98–107)
CO2 SERPL-SCNC: 34 MEQ/L (ref 21–32)
CREAT SERPL-MCNC: 0.6 MG/DL (ref 0.55–1.02)
GFR SERPL CREATININE-BSD FRML MDRD: > 60 ML/MIN/{1.73_M2} (ref 32–?)
GLUCOSE SERPL-MCNC: 111 MG/DL (ref 83–110)
POTASSIUM SERPL-SCNC: 4.2 MEQ/L (ref 3.5–5.1)
SODIUM SERPL-SCNC: 137 MEQ/L (ref 136–145)

## 2017-07-05 ENCOUNTER — HOSPITAL ENCOUNTER (OUTPATIENT)
Dept: HOSPITAL 53 - M LAB REF | Age: 82
End: 2017-07-05
Attending: FAMILY MEDICINE
Payer: MEDICARE

## 2017-07-05 DIAGNOSIS — R35.0: Primary | ICD-10-CM

## 2017-08-28 ENCOUNTER — HOSPITAL ENCOUNTER (OUTPATIENT)
Dept: HOSPITAL 53 - M LAB REF | Age: 82
End: 2017-08-28
Attending: FAMILY MEDICINE
Payer: MEDICARE

## 2017-08-28 DIAGNOSIS — R53.1: Primary | ICD-10-CM

## 2018-05-22 ENCOUNTER — HOSPITAL ENCOUNTER (OUTPATIENT)
Dept: HOSPITAL 53 - SKLABADC | Age: 83
End: 2018-05-22
Attending: FAMILY MEDICINE
Payer: MEDICARE

## 2018-05-22 DIAGNOSIS — R41.0: Primary | ICD-10-CM

## 2018-05-22 LAB
APPEARANCE, URINE: CLEAR
BACTERIA UR QL AUTO: NEGATIVE
BILIRUBIN, URINE AUTO: NEGATIVE
BLOOD, URINE BLOOD: NEGATIVE
GLUCOSE, URINE (UA) AUTO: NEGATIVE MG/DL
KETONE, URINE AUTO: NEGATIVE MG/DL
LEUKOCYTE ESTERASE UR QL STRIP.AUTO: (no result)
NITRITE, URINE AUTO: NEGATIVE
PH,URINE: 7 UNITS (ref 5–9)
PROT UR QL STRIP.AUTO: NEGATIVE MG/DL
RBC, URINE AUTO: 3 /HPF (ref 0–3)
SPECIFIC GRAVITY URINE AUTO: 1 (ref 1–1.03)
SQUAMOUS #/AREA URNS AUTO: 0 /HPF (ref 0–6)
UROBILINOGEN, URINE AUTO: 0.2 MG/DL (ref 0–2)
WBC, URINE AUTO: 1 /HPF (ref 0–3)

## 2018-05-22 PROCEDURE — 81001 URINALYSIS AUTO W/SCOPE: CPT

## 2018-09-20 ENCOUNTER — HOSPITAL ENCOUNTER (OUTPATIENT)
Dept: HOSPITAL 53 - M LAB | Age: 83
End: 2018-09-20
Payer: MEDICARE

## 2018-09-20 DIAGNOSIS — I50.33: Primary | ICD-10-CM

## 2018-09-20 DIAGNOSIS — I51.7: ICD-10-CM

## 2018-09-20 LAB
ANION GAP: 8 MEQ/L (ref 8–16)
BLOOD UREA NITROGEN: 25 MG/DL (ref 7–18)
CALCIUM LEVEL: 8.7 MG/DL (ref 8.8–10.2)
CARBON DIOXIDE LEVEL: 35 MEQ/L (ref 21–32)
CHLORIDE LEVEL: 98 MEQ/L (ref 98–107)
CREATININE FOR GFR: 0.9 MG/DL (ref 0.55–1.3)
GFR SERPL CREATININE-BSD FRML MDRD: > 60 ML/MIN/{1.73_M2} (ref 32–?)
GLUCOSE, FASTING: 104 MG/DL (ref 70–100)
POTASSIUM SERUM: 3.7 MEQ/L (ref 3.5–5.1)
SODIUM LEVEL: 141 MEQ/L (ref 136–145)

## 2018-09-20 PROCEDURE — 71046 X-RAY EXAM CHEST 2 VIEWS: CPT

## 2018-11-20 ENCOUNTER — HOSPITAL ENCOUNTER (OUTPATIENT)
Dept: HOSPITAL 53 - SKLABADC | Age: 83
End: 2018-11-20
Attending: FAMILY MEDICINE
Payer: MEDICARE

## 2018-11-20 DIAGNOSIS — J01.90: Primary | ICD-10-CM

## 2018-11-20 LAB
ANION GAP: 5 MEQ/L (ref 8–16)
BLOOD UREA NITROGEN: 22 MG/DL (ref 7–18)
CALCIUM LEVEL: 8.7 MG/DL (ref 8.8–10.2)
CARBON DIOXIDE LEVEL: 35 MEQ/L (ref 21–32)
CHLORIDE LEVEL: 97 MEQ/L (ref 98–107)
CHOLEST SERPL-MCNC: 167 MG/DL (ref ?–200)
CHOLESTEROL RISK RATIO: 4.39 (ref ?–5)
CREATININE FOR GFR: 0.94 MG/DL (ref 0.55–1.3)
FREE T4: 1.19 NG/DL (ref 0.76–1.46)
GFR SERPL CREATININE-BSD FRML MDRD: 59.8 ML/MIN/{1.73_M2} (ref 32–?)
GLUCOSE, FASTING: 98 MG/DL (ref 70–100)
HDLC SERPL-MCNC: 38 MG/DL (ref 40–?)
LDL CHOLESTEROL: 112 MG/DL (ref ?–100)
NONHDLC SERPL-MCNC: 129 MG/DL
POTASSIUM SERUM: 3.8 MEQ/L (ref 3.5–5.1)
SODIUM LEVEL: 137 MEQ/L (ref 136–145)
THYROID STIMULATING HORMONE: 2 UIU/ML (ref 0.36–3.74)
TRIGLYCERIDES LEVEL: 86 MG/DL (ref ?–150)

## 2018-11-20 PROCEDURE — 84443 ASSAY THYROID STIM HORMONE: CPT

## 2018-12-27 ENCOUNTER — HOSPITAL ENCOUNTER (OUTPATIENT)
Dept: HOSPITAL 53 - M LAB REF | Age: 83
End: 2018-12-27
Attending: PHYSICIAN ASSISTANT
Payer: MEDICARE

## 2018-12-27 DIAGNOSIS — R35.0: Primary | ICD-10-CM

## 2019-04-06 ENCOUNTER — HOSPITAL ENCOUNTER (INPATIENT)
Dept: HOSPITAL 53 - M ED | Age: 84
LOS: 5 days | DRG: 682 | End: 2019-04-11
Attending: INTERNAL MEDICINE | Admitting: INTERNAL MEDICINE
Payer: MEDICARE

## 2019-04-06 VITALS — WEIGHT: 102.74 LBS | HEIGHT: 61 IN | BODY MASS INDEX: 19.4 KG/M2

## 2019-04-06 VITALS — SYSTOLIC BLOOD PRESSURE: 53 MMHG

## 2019-04-06 VITALS — SYSTOLIC BLOOD PRESSURE: 80 MMHG | DIASTOLIC BLOOD PRESSURE: 40 MMHG

## 2019-04-06 DIAGNOSIS — Z79.82: ICD-10-CM

## 2019-04-06 DIAGNOSIS — Z88.8: ICD-10-CM

## 2019-04-06 DIAGNOSIS — K72.00: ICD-10-CM

## 2019-04-06 DIAGNOSIS — Z66: ICD-10-CM

## 2019-04-06 DIAGNOSIS — I27.20: ICD-10-CM

## 2019-04-06 DIAGNOSIS — Z79.899: ICD-10-CM

## 2019-04-06 DIAGNOSIS — E87.5: ICD-10-CM

## 2019-04-06 DIAGNOSIS — I08.0: ICD-10-CM

## 2019-04-06 DIAGNOSIS — Z51.5: ICD-10-CM

## 2019-04-06 DIAGNOSIS — R57.1: ICD-10-CM

## 2019-04-06 DIAGNOSIS — F03.90: ICD-10-CM

## 2019-04-06 DIAGNOSIS — E87.2: ICD-10-CM

## 2019-04-06 DIAGNOSIS — Z91.030: ICD-10-CM

## 2019-04-06 DIAGNOSIS — N17.9: Primary | ICD-10-CM

## 2019-04-06 DIAGNOSIS — R64: ICD-10-CM

## 2019-04-06 LAB
ALBUMIN SERPL BCG-MCNC: 3.1 GM/DL (ref 3.2–5.2)
ALT SERPL W P-5'-P-CCNC: 217 U/L (ref 12–78)
BASE EXCESS BLDA CALC-SCNC: -4.1 MMOL/L (ref -2–2)
BASOPHILS # BLD AUTO: 0.1 10^3/UL (ref 0–0.2)
BASOPHILS NFR BLD AUTO: 0.6 % (ref 0–1)
BILIRUB CONJ SERPL-MCNC: 0.7 MG/DL (ref 0–0.2)
BILIRUB SERPL-MCNC: 2.3 MG/DL (ref 0.2–1)
BUN SERPL-MCNC: 63 MG/DL (ref 7–18)
BUN SERPL-MCNC: 63 MG/DL (ref 7–18)
CALCIUM SERPL-MCNC: 8.8 MG/DL (ref 8.8–10.2)
CALCIUM SERPL-MCNC: 8.9 MG/DL (ref 8.8–10.2)
CHLORIDE SERPL-SCNC: 106 MEQ/L (ref 98–107)
CHLORIDE SERPL-SCNC: 97 MEQ/L (ref 98–107)
CK MB CFR.DF SERPL CALC: 2.6
CK MB SERPL-MCNC: 2 NG/ML (ref ?–3.6)
CK SERPL-CCNC: 96 U/L (ref 26–192)
CO2 BLDA CALC-SCNC: 20.7 MEQ/L (ref 23–31)
CO2 SERPL-SCNC: 25 MEQ/L (ref 21–32)
CO2 SERPL-SCNC: 27 MEQ/L (ref 21–32)
CREAT SERPL-MCNC: 1.92 MG/DL (ref 0.55–1.3)
CREAT SERPL-MCNC: 2.24 MG/DL (ref 0.55–1.3)
EOSINOPHIL # BLD AUTO: 0 10^3/UL (ref 0–0.5)
EOSINOPHIL NFR BLD AUTO: 0 % (ref 0–3)
GFR SERPL CREATININE-BSD FRML MDRD: 21.9 ML/MIN/{1.73_M2} (ref 32–?)
GFR SERPL CREATININE-BSD FRML MDRD: 26.2 ML/MIN/{1.73_M2} (ref 32–?)
GLUCOSE SERPL-MCNC: 109 MG/DL (ref 70–100)
GLUCOSE SERPL-MCNC: 149 MG/DL (ref 70–100)
HCO3 BLDA-SCNC: 19.7 MEQ/L (ref 22–26)
HCO3 STD BLDA-SCNC: 21.1 MEQ/L (ref 22–26)
HCT VFR BLD AUTO: 50.5 % (ref 36–47)
HGB BLD-MCNC: 16.4 G/DL (ref 12–15.5)
INR PPP: 1.4
LIPASE SERPL-CCNC: 495 U/L (ref 73–393)
LYMPHOCYTES # BLD AUTO: 1.8 10^3/UL (ref 1.5–4.5)
LYMPHOCYTES NFR BLD AUTO: 18.4 % (ref 24–44)
MAGNESIUM SERPL-MCNC: 2.6 MG/DL (ref 1.8–2.4)
MCH RBC QN AUTO: 30 PG (ref 27–33)
MCHC RBC AUTO-ENTMCNC: 32.5 G/DL (ref 32–36.5)
MCV RBC AUTO: 92.3 FL (ref 80–96)
MONOCYTES # BLD AUTO: 0.7 10^3/UL (ref 0–0.8)
MONOCYTES NFR BLD AUTO: 7.2 % (ref 0–5)
NEUTROPHILS # BLD AUTO: 6.9 10^3/UL (ref 1.8–7.7)
NEUTROPHILS NFR BLD AUTO: 71.7 % (ref 36–66)
PCO2 BLDA: 33 MMHG (ref 35–45)
PH BLDA: 7.39 UNITS (ref 7.35–7.45)
PLATELET # BLD AUTO: 222 10^3/UL (ref 150–450)
PO2 BLDA: 77.9 MMHG (ref 75–100)
POTASSIUM SERPL-SCNC: 4.7 MEQ/L (ref 3.5–5.1)
POTASSIUM SERPL-SCNC: 5.7 MEQ/L (ref 3.5–5.1)
PROT SERPL-MCNC: 6.8 GM/DL (ref 6.4–8.2)
PROTHROMBIN TIME: 17.4 SECONDS (ref 12.1–14.4)
RBC # BLD AUTO: 5.47 10^6/UL (ref 4–5.4)
SAO2 % BLDA: 93.9 % (ref 95–99)
SODIUM SERPL-SCNC: 136 MEQ/L (ref 136–145)
SODIUM SERPL-SCNC: 142 MEQ/L (ref 136–145)
TROPONIN I SERPL-MCNC: 0.1 NG/ML (ref ?–0.1)
WBC # BLD AUTO: 9.7 10^3/UL (ref 4–10)

## 2019-04-06 RX ADMIN — SODIUM CHLORIDE SCH UNITS: 4.5 INJECTION, SOLUTION INTRAVENOUS at 21:47

## 2019-04-06 RX ADMIN — SODIUM CHLORIDE SCH MLS/HR: 9 INJECTION, SOLUTION INTRAVENOUS at 16:08

## 2019-04-06 RX ADMIN — ONDANSETRON PRN MG: 2 INJECTION INTRAMUSCULAR; INTRAVENOUS at 16:12

## 2019-04-06 RX ADMIN — SODIUM CHLORIDE SCH MLS/HR: 9 INJECTION, SOLUTION INTRAVENOUS at 09:26

## 2019-04-06 NOTE — HPEPDOC
General


Date of Admission





Primary Care Physician:  AFUA CHU DO


Attending Physician:  MARY WOODWARD MD


Chief Complaint


The patient is a 89-year-old female admitted with a reason for visit of Samy dozier.


Source:  Family


Exam Limitations:  Clinical conditions, Dementia





History of Present Illness


This is a 89 years old, white female with past medical history of dementia, 

severe aortic stenosis, mitral regurgitation: not a surgical candidate,  

pulmonary hypertension and severe dementia, came to ER with chief complaints of 

nausea, vomiting and abdominal pain along with loose bowel movements since last 

2 days.


History obtained from patient's daughter as patient has advanced dementia and  

secondary to her present  clinical status, she is  unable to provide me any 

history.


As per daughter, patient is slowly becoming very lethargic, not eating or 

drinking and   vomiting if anything is given orally.


Called by ER to admit patient secondary to her high BUN/creatinine high 

potassium with high lactate, but no source of infection


Patient also received a liter of IV bolus followed by normal saline 100 mL per 

hour. In the ED for hypotension without any resolution





Home Medications


Scheduled


 (Dorzolamide HCl/Timolol M 22.3-6.8 mg/ml) 1 Sol Sol, 1 DROP OU BID, (Reported)


Aspirin (Aspirin 81) 81 Mg Tab, 81 MG PO DAILY, (Reported)


Lactase (Dairy Relief) 3,000 Unit Tab, 3,000 UNIT PO QPM, (Reported)


Latanoprost (Xalatan) 0.005 % Sol, 1 DROP OU QHS, (Reported)


Levothyroxine Sodium (Synthroid) 25 Mcg Tab, 25 MCG PO DAILY, (Reported)


Multivitamins *[facility] STOCKED* (Thera M Plus *[facility] STOCKED*) 1 Tab Tab, 1 TAB PO 

DAILY, (Reported)


Omeprazole (Omeprazole Dr) 20 Mg Cap, 20 MG PO BID, (Reported)


Torsemide (Torsemide) 20 Mg Tab, 40 MG PO DAILY, (Reported)





Scheduled PRN


Torsemide (Torsemide) 20 Mg Tab, 20 MG PO QPM PRN for SWELLING, (Reported)





Allergies


Coded Allergies:  


     bee venom protein (honey bee) (Verified  Allergy, Severe, SWOLLEN, 4/6/19)


     pregabalin (Verified  Adverse Reaction, Intermediate, PARANOIA, 4/6/19)





Past Medical History


Medical History


Dementia, aortic stenosis, pulmonary hypertension, mitral regurgitation


Surgical History


Cholecystectomy





Family History


Significant Family History:  Noncontributory





Social History


* Smoker:  Denies





Review of Systems


Constitutional:  Reports: Other (unable to obtained revealed system secondary to

advanced dementia and patient's mental status)





Physical Examination


Eye Exam:  Positive: Conjunctiva & lids normal, EOMI


Neck Exam:  Positive: Supple, JVD


Chest Exam:  Positive: Other (bilateral basilar crackles audible)


Heart Exam:  Positive: Tachycardic, Murmurs


Telemetry:  Positive: Sinus, SV Tach, PVCs, PACs


Abdomen Exam:  Positive: Normal bowel sounds, Soft


Extremity Exam:  Positive: Clubbing, Cyanosis, Edema





Vital Signs





Vital Signs








  Date Time  Temp Pulse Resp B/P (MAP) Pulse Ox O2 Delivery O2 Flow Rate FiO2


 


4/6/19 11:22  126   89   


 


4/6/19 11:15    83/67 (72)    


 


4/6/19 07:49 98.5  16   Room Air  











Laboratory Data


Labs 24H


Laboratory Tests 2


4/6/19 08:42: 


Immature Granulocyte % (Auto) 2.1, White Blood Count 9.7, Red Blood Count 5.47H,

Hemoglobin 16.4H, Hematocrit 50.5H, Mean Corpuscular Volume 92.3, Mean 

Corpuscular Hemoglobin 30.0, Mean Corpuscular Hemoglobin Concent 32.5, Red Cell 

Distribution Width 14.1, Platelet Count 222, Neutrophils (%) (Auto) 71.7H, 

Lymphocytes (%) (Auto) 18.4L, Monocytes (%) (Auto) 7.2H, Eosinophils (%) (Auto) 

0.0, Basophils (%) (Auto) 0.6, Neutrophils # (Auto) 6.9, Lymphocytes # (Auto) 

1.8, Monocytes # (Auto) 0.7, Eosinophils # (Auto) 0.0, Basophils # (Auto) 0.1, 

Nucleated Red Blood Cells % (auto) 0.2H, Prothrombin Time 17.4H, Prothromb Time 

International Ratio 1.40, Anion Gap 12, Glomerular Filtration Rate 21.9L, Lactic

Acid Level 4.4*H, Calcium Level 8.8, Aspartate Amino Transf (AST/SGOT) 172H, 

Alanine Aminotransferase (ALT/SGPT) 217H, Alkaline Phosphatase 136H, Total 

Bilirubin 2.3H, Direct Bilirubin 0.7H, Total Creatine Kinase 96, Creatine Kinase

MB 2.0, Creatine Kinase MB Relative Index 2.60, Troponin I 0.10, Total Protein 

6.8, Albumin 3.1L, Albumin/Globulin Ratio 0.84L, Lipase 495H


4/6/19 10:37: 


Blood Gas Bicarbonate Standard 21.1L, Arterial Blood pH 7.394, Arterial Blood 

Partial Pressure CO2 33.0L, Arterial Blood Partial Pressure O2 77.9, Arterial 

Blood Total CO2 20.7L, Arterial Blood HCO3 19.7L, Arterial Blood Base Excess -

4.1L, Arterial Blood Oxygen Saturation 93.9L


4/6/19 11:07: 


CBC/BMP


Laboratory Tests


4/6/19 08:42








Red Blood Count 5.47 H, Mean Corpuscular Volume 92.3, Mean Corpuscular 

Hemoglobin 30.0, Mean Corpuscular Hemoglobin Concent 32.5, Red Cell Distribution

Width 14.1, Neutrophils (%) (Auto) 71.7 H, Lymphocytes (%) (Auto) 18.4 L, 

Monocytes (%) (Auto) 7.2 H, Eosinophils (%) (Auto) 0.0, Basophils (%) (Auto) 

0.6, Neutrophils # (Auto) 6.9, Lymphocytes # (Auto) 1.8, Monocytes # (Auto) 0.7,

Eosinophils # (Auto) 0.0, Basophils # (Auto) 0.1


Microbiology





Microbiology


4/6/19 Blood Culture, Received


         Pending


4/6/19 Blood Culture, Received


         Pending





Problems





(1) TRAY (acute kidney injury)


Status:  Acute


Problem Text:  89 years old, white female with past medical history of multiple 

medical problems. She is DNR as per patient's daughter also has a history of 

dementia, aortic stenosis, mitral regurgitation, not a surgical candidate for 

cardiac procedures in the past. Pulmonary hypertension is being admitted 

secondary to high BUN/creatinine and high potassium.


Also, patient was found to have a high lactate 4.4 without any source of 

infection.


Patient developed crampy abdominal pains, nausea, vomiting since 2 days ago


The patient to [facility] with telemetry monitoring


Strict I and O's


Continuous pulse ox


Cardiac telemetry monitoring


O2 support


Continue home meds


Nothing by mouth except meds


Continue IV fluids normal saline at 100 mL per hour with close observation 

secondary to history of diastolic heart failure and severe aortic stenosis in 

this patient


Patient is hypotensive with tachyarrhythmias. Eyes discussed with Dr. vogel, he

will evaluate patient in ED and recommend any further cardiac recommendations


Discussed with patient's daughter, patient is DNR. We will continue patient's 

home medication. In the meantime, we will closely follow patient and manage 

treatment accordingly





(2) Tachyarrhythmia


Problem Text:   consultation was called and will await further recommendations 

from cardiology





(3) Hyperkalemia


Status:  Acute


Problem Text:  Patient received calcium in the ED along with insulin and D50


Unable to read any hypoglycemic EKG changes secondary to baseline arrhythmias


Will repeat CMP and lactic acid in an hour  





(4) Lactic acidosis


Status:  Acute


Problem Text:  Etiology unknown but most likely could be secondary to acute 

kidney injury


We will repeat lactate and also will get a pro-calcitonin to rule out any 

infection cause








Plan / VTE


VTE Prophylaxis Ordered?:  Yes











MARY WOODWARD MD               Apr 6, 2019 12:43

## 2019-04-06 NOTE — CR
DATE OF CONSULTATION:  04/06/2019

 

REFERRING PHYSICIAN:  Dr. Young Salgado of the hospitalist service.

 

INDICATION:  Hypotension, atrial fibrillation.

 

HISTORY OF PRESENT ILLNESS:  Mrs. Justin is an 89-year-old female, who is known 
to

me from outpatient setting.  She has combined aortic and mitral valvular

disease and was not felt to be a surgical candidate.  In the last several 
months,

she has been slowly losing weight, and according to the daughter-in-law who is 
at

bedside there has been slowly progressive mental decline.  The situation got 
much

worse in the last week, again according to her daughter-in-law, there was 
minimal

oral intake.  She had a few episodes of vomiting and eventually the situation

reached the point that she decided to bring her for hospitalization earlier 
today

after the patient was very weak, was unable to ambulate and spent virtually all

of the time sleeping.

 

On initial evaluation, she was found to be quite hypotensive with the blood

pressure initially actually recorded 121/84, but then the second measurement was

only 87/58.  ECG revealed presence of atrial fibrillation and left bundle branch

block which is new compared to the old data.  By this time she was given

approximately 1.2 liters of fluid, and when I saw the patient, she was somewhat

somnolent but arousable and when aroused answered questions completely

appropriately as far as the orientation to her person.  She did not know the 
date

and she did not know where she was, but she denied any chest pain, shortness of

breath, palpitations, nausea or abdominal pain.

 

PAST MEDICAL HISTORY:

1.  Combined aortic and mitral valve disease with severe aortic stenosis and

severe mitral insufficiency based on echocardiogram 2017, felt not to be a

surgical candidate.

2.  Dementia.

3.  Failure of thrives with progressive weight loss.

 

SURGICAL HISTORY:  Cholecystectomy.

 

OUTPATIENT MEDICATIONS:

- aspirin 81 mg a day

- Xalatan eye drops

- levothyroxine 25 mcg a day

- multivitamin

- omeprazole 20 mg twice a day

- torsemide 40 mg day

 

FAMILY HISTORY:  No longer relevant.

 

SOCIAL HISTORY:  Patient lives with her family of her son and during the day is

under day care.  Her daughter-in-law her principal caregiver and her healthcare

proxy.

 

PHYSICAL EXAMINATION:

Elderly, frail woman, does not appear to be any distress.  The last documented

blood pressure was only 80/60, heart rate between 100-110s.  It is atrial

fibrillation with narrow QRS complex alternating with runs of left bundle branch

block.  I cannot reliably distinguish nonsustained ventricular tachycardia 
versus

aberrancy.  Saturation was 92% and weight was documented at 42 kg.

Her jugular venous pulse (JVP) is not high by my physical exam.  It is just

barely visible during expiration.

Lungs are reasonably clear.  No crackles or rhonchi or wheezing is appreciated.

 

Heart exam reveals irregular rhythm.  There is a harsh murmur over the aortic

valve radiating towards her neck.  There is also blowing murmur at the axilla.

Abdomen is very cachectic.  There are scars after prior laparotomy.  Soft.  No

guarding.  There is some tenderness in left upper quadrant.  No rebound

tenderness.  Bowel sounds are present.

Extremities are free of edema.

Neurologically, she is somnolent but she is easily arousable and answers

questions.  No focal deficit per se.

 

LABORATORY DATA:  WBC count 9.7, hemoglobin 16.4, hematocrit 50.5, platelet 
count

is 222,000.

 

Basic metabolic panel from 08:42 this morning, sodium 136, potassium 5.7, BUN 
63,

creatinine 2.2, and glucose 149.  Lactic acid was 4.4.

 

Liver function tests are elevated with , .  Troponin 0.1.  Albumin

is 3.1.  Lipase is elevated at 495.

 

INR was 1.4.

 

ABGs at 10:37, pH 7.39, pCO2 33, pO2 78 and saturation 94%.

 

ECG revealed atrial fibrillation with rapid ventricular rate and left bundle

branch block.

 

Chest x-ray is consistent with cardiomegaly.  There are no pleural effusions but

chronic-appearing vascular congestion is noted.

 

CT of the abdomen did not reveal any acute pathology.  There is extensive

atherosclerosis of both thoracic and abdominal aorta.

 

ASSESSMENT AND PLAN:  Mrs. Justin is an 89-year-old female, who has dementia that

has been progressive over time, who presented with approximately 1-week history

of minimal oral intake with intermittent vomiting.  The initial blood pressure

measurement was relatively normotensive but she has been quite hypotensive 
since,

and the rhythm reveals atrial fibrillation with heart rate in 110s alternating

with wide complex rhythm.  I cannot completely rule out that this is only a

aberrant conduction during episodes of tachycardia but alternatively could also

represent runs of nonsustained ventricular tachycardia.

 

In my opinion, this is a progression of her failure to thrive.  According to her

daughter-in-law, she lost a lot of weight lately and she was very frail to start

with.  It is probable that the nausea and vomiting at some point led to

development of atrial fibrillation which caused low cardiac output in setting of

severe aortic stenosis and severe mitral insufficiency and renal failure.  She 
is

clearly very hemoconcentrated as evidenced by very high hemoglobin and the

initial step should be very vigorous rehydration.  I do not believe that she

would tolerate or should be given any therapies addressing her tachycardia or

atrial fibrillation.  My plan would be comfort care provided she would not

respond to volume resuscitation.  I am afraid that because her underlying 
cardiac

pathology is not correctable there is not much else what we can do in order to

correct her cardiac status.  I expressed this opinion to her daughter-in-law and

explained that I unfortunately am quite apprehensive that the outcome here will

not be favorable.  She expressed understanding, and patient is DO NOT INTUBATE,

DO NOT RESUSCITATE and she apparently on numerous occasions clearly stated that

she would not want to have any heroic measures undertaken in similar situations.

VIKTORIYA

## 2019-04-06 NOTE — IPNPDOC
Date Seen


The patient was seen on 4/6/19.





Progress Note


INTERIM PROGRESS NOTE





Was called to the patient's room as nursing staff were unable to get BP except 

by doppler and it was in the 50s despite having received almost 2L fluids. The 

patient was seen by Dr. Brown earlier today for consultation and family (son 

and daughter-in-law) were in the room. Patient was responsive but was very 

drowsy.





PE:


Cardiac: RRR, no mumurs/rubs/gallops


Lungs: Clear to auscultation bilaterally


Extremities: only trace edema in lower extremities bilaterally





I had a discussion with family about goals of care and end of life wishes. I 

explained how the patient was experiencing atrial fibrillation and RVR with 

possible LBBB but was not a candidate for antiarrhythmics. It was also explained

that as she was not responsive to fluids, further escalation of care (transfer 

to ICU, central line, pressors, etc) was an option, but would not be comfortable

for the patient. The family stressed that they wanted to keep her comfortable 

and not to overload her with fluids so that she would become excessively 

edematous, and thus uncomfortable. It was decided to change the patient's code 

status to COMFORT MEASURES ONLY. Family was all in agreement.





VS, I&O, 24H, Fishbone


Vital Signs/I&O





Vital Signs








  Date Time  Temp Pulse Resp B/P (MAP) Pulse Ox O2 Delivery O2 Flow Rate FiO2


 


4/6/19 20:00 98.0 127 20  91   


 


4/6/19 15:30    80/40 (53)    


 


4/6/19 15:26      Room Air  











Laboratory Data


24H LABS


Laboratory Tests 2


4/6/19 08:42: 


Immature Granulocyte % (Auto) 2.1, White Blood Count 9.7, Red Blood Count 5.47H,

Hemoglobin 16.4H, Hematocrit 50.5H, Mean Corpuscular Volume 92.3, Mean 

Corpuscular Hemoglobin 30.0, Mean Corpuscular Hemoglobin Concent 32.5, Red Cell 

Distribution Width 14.1, Platelet Count 222, Neutrophils (%) (Auto) 71.7H, 

Lymphocytes (%) (Auto) 18.4L, Monocytes (%) (Auto) 7.2H, Eosinophils (%) (Auto) 

0.0, Basophils (%) (Auto) 0.6, Neutrophils # (Auto) 6.9, Lymphocytes # (Auto) 

1.8, Monocytes # (Auto) 0.7, Eosinophils # (Auto) 0.0, Basophils # (Auto) 0.1, 

Nucleated Red Blood Cells % (auto) 0.2H, Prothrombin Time 17.4H, Prothromb Time 

International Ratio 1.40, Anion Gap 12, Glomerular Filtration Rate 21.9L, Lactic

Acid Level 4.4*H, Calcium Level 8.8, Aspartate Amino Transf (AST/SGOT) 172H, 

Alanine Aminotransferase (ALT/SGPT) 217H, Alkaline Phosphatase 136H, Total 

Bilirubin 2.3H, Direct Bilirubin 0.7H, Total Creatine Kinase 96, Creatine Kinase

MB 2.0, Creatine Kinase MB Relative Index 2.60, Troponin I 0.10, Total Protein 

6.8, Albumin 3.1L, Albumin/Globulin Ratio 0.84L, Lipase 495H


4/6/19 10:37: 


Blood Gas Bicarbonate Standard 21.1L, Arterial Blood pH 7.394, Arterial Blood 

Partial Pressure CO2 33.0L, Arterial Blood Partial Pressure O2 77.9, Arterial 

Blood Total CO2 20.7L, Arterial Blood HCO3 19.7L, Arterial Blood Base Excess -

4.1L, Arterial Blood Oxygen Saturation 93.9L


4/6/19 11:07: Osmolality 323H


4/6/19 14:00: 


Anion Gap 11, Glomerular Filtration Rate 26.2L, Lactic Acid Level 3.5*H, Calcium

Level 8.9, Blood Urea Nitrogen 63H, Creatinine 1.92H, Sodium Level 142, 

Potassium Level 4.7, Chloride Level 106, Carbon Dioxide Level 25, Magnesium 

Level 2.6H


4/6/19 18:36: 


Lactic Acid Followup at 4 Hours 3.1*H, Troponin I 0.12H


CBC/BMP


Laboratory Tests


4/6/19 08:42








Red Blood Count 5.47 H, Mean Corpuscular Volume 92.3, Mean Corpuscular 

Hemoglobin 30.0, Mean Corpuscular Hemoglobin Concent 32.5, Red Cell Distribution

Width 14.1, Neutrophils (%) (Auto) 71.7 H, Lymphocytes (%) (Auto) 18.4 L, 

Monocytes (%) (Auto) 7.2 H, Eosinophils (%) (Auto) 0.0, Basophils (%) (Auto) 

0.6, Neutrophils # (Auto) 6.9, Lymphocytes # (Auto) 1.8, Monocytes # (Auto) 0.7,

Eosinophils # (Auto) 0.0, Basophils # (Auto) 0.1





4/6/19 14:00








Calcium Level 8.9


Microbiology





Microbiology


4/6/19 Blood Culture, Received


         Pending


4/6/19 Blood Culture, Received


         Pending





GME ATTESTATION


GME ATTESTATION


My faculty preceptor for this patient encounter was physically present during 

the encounter and was fully available. All aspects of the patient interview, 

examination, medical decision making process, and medical care plan development 

were reviewed and approved by the faculty preceptor. The faculty preceptor is 

aware and concurs with the plan as stated in the body of this note and will 

attest to such by his/her cosignature.











MITRA BECKER MD               Apr 6, 2019 20:58

## 2019-04-06 NOTE — REP
Clinical:  Abdominal pain.

 

Technique:  Upright view of the chest with supine and upright views of the

abdomen and pelvis.

 

Findings:

Frontal upright view of the chest demonstrates cardiomegaly and chronic changes.

No free air below the diaphragm to suspect pneumoperitoneum.  Supine and upright

views of the abdomen and pelvis demonstrate nonspecific bowel gas pattern.  Fecal

stasis cannot be excluded.  Skeletal structures demonstrate diffuse age related

osteopenia and degenerative changes including chronic dextroconvex scoliosis.

Evidence of prior cholecystectomy and possible gastric bypass surgery.

 

Impression:

Nonspecific bowel gas pattern.  Fecal stasis cannot be excluded.

 

 

Electronically Signed by

Zhang Adams MD 04/06/2019 08:24 A

## 2019-04-06 NOTE — REP
Clinical:  Intractable vomiting.

 

Technique:  Axial noncontrast images from the thoracic inlet to the upper abdomen

eighth coronal and sagittal re-formations.

 

Comparison:  10/09/2005.

 

Findings:

Lung fields demonstrate diffuse age-related chronic interstitial changes and

scattered scarring along with findings to suggest chronic pulmonary hypertension.

8 mm focal density in the left upper lobe (image 28) is nonspecific and likely

represents chronic change but pulmonary nodule cannot be excluded.  No

consolidation.  No pleural effusion.  No pneumothorax.  Extensive atherosclerotic

changes to the thoracic aorta, coronary arteries, and mitral valve annulus along

with cardiomegaly is appreciated.  Surrounding musculoskeletal structures without

focal osseous abnormality.

 

Impression:

1.  Cardiomegaly and chronic avascular congestion as well as chronic-appearing

pulmonary hypertension.

2.  Lung fields demonstrate diffuse chronic interstitial changes along with

scattered scarring.

3.  8 mm and 5 mm focal densities in the left upper lobe likely represent chronic

change but pulmonary nodule cannot be excluded.  Follow-up examination in 3 - 6

months may be warranted.

 

 

Electronically Signed by

Zhang Adams MD 04/06/2019 09:47 A

## 2019-04-06 NOTE — REP
Clinical:  Intractable vomiting.

 

Technique:  Axial noncontrast images from the lung bases to the pubic symphysis

with coronal and sagittal re-formations.

 

Comparison:  01/28/2016.

 

Findings:

Refer to chest CT for complete evaluation of the chest and lung bases.

 

Small amount of pneumobilia is appreciated.  The patient is noted to be status

post cholecystectomy.  Liver, spleen, pancreas, bilateral adrenal glands and

kidneys are relatively normal / stable.  The enteric system is without

obstruction or acute inflammatory process although moderate fecal stasis is

suggested.  Colonic diverticulosis noted without acute diverticulitis.  Pelvis

demonstrates normal bladder and age-appropriate uterus/adnexa.  No ascites.  No

free air.  No obvious adenopathy.  Atherosclerotic changes of the aorta and

vasculature noted without aneurysm.  Musculoskeletal structures demonstrate

advanced degenerative changes without focal osseous abnormality.

 

Impression:

1.  Moderate fecal stasis is suggested.  No obstruction or obvious acute anterior

colonic process appreciated.

2.  Diverticulosis without acute diverticulitis.

3.  Small amount of pneumobilia improved from prior examination.

4.  No acute abdominopelvic pathology otherwise appreciated.

 

 

Electronically Signed by

Zhang Adams MD 04/06/2019 09:51 A

## 2019-04-07 VITALS — SYSTOLIC BLOOD PRESSURE: 88 MMHG | DIASTOLIC BLOOD PRESSURE: 52 MMHG

## 2019-04-07 VITALS — SYSTOLIC BLOOD PRESSURE: 60 MMHG

## 2019-04-07 VITALS — SYSTOLIC BLOOD PRESSURE: 92 MMHG | DIASTOLIC BLOOD PRESSURE: 50 MMHG

## 2019-04-07 VITALS — SYSTOLIC BLOOD PRESSURE: 98 MMHG

## 2019-04-07 LAB
ALBUMIN SERPL BCG-MCNC: 2.6 GM/DL (ref 3.2–5.2)
ALT SERPL W P-5'-P-CCNC: 566 U/L (ref 12–78)
BILIRUB SERPL-MCNC: 3.3 MG/DL (ref 0.2–1)
BUN SERPL-MCNC: 74 MG/DL (ref 7–18)
CALCIUM SERPL-MCNC: 8.3 MG/DL (ref 8.8–10.2)
CHLORIDE SERPL-SCNC: 108 MEQ/L (ref 98–107)
CO2 SERPL-SCNC: 21 MEQ/L (ref 21–32)
CREAT SERPL-MCNC: 2.46 MG/DL (ref 0.55–1.3)
GFR SERPL CREATININE-BSD FRML MDRD: 19.7 ML/MIN/{1.73_M2} (ref 32–?)
GLUCOSE SERPL-MCNC: 90 MG/DL (ref 70–100)
HCT VFR BLD AUTO: 45.9 % (ref 36–47)
HGB BLD-MCNC: 14.9 G/DL (ref 12–15.5)
LIPASE SERPL-CCNC: 468 U/L (ref 73–393)
MAGNESIUM SERPL-MCNC: 2.5 MG/DL (ref 1.8–2.4)
MCH RBC QN AUTO: 30.3 PG (ref 27–33)
MCHC RBC AUTO-ENTMCNC: 32.5 G/DL (ref 32–36.5)
MCV RBC AUTO: 93.3 FL (ref 80–96)
PLATELET # BLD AUTO: 179 10^3/UL (ref 150–450)
POTASSIUM SERPL-SCNC: 5.3 MEQ/L (ref 3.5–5.1)
PROT SERPL-MCNC: 5.7 GM/DL (ref 6.4–8.2)
RBC # BLD AUTO: 4.92 10^6/UL (ref 4–5.4)
SODIUM SERPL-SCNC: 141 MEQ/L (ref 136–145)
WBC # BLD AUTO: 14.2 10^3/UL (ref 4–10)

## 2019-04-07 RX ADMIN — SODIUM CHLORIDE SCH MLS/HR: 9 INJECTION, SOLUTION INTRAVENOUS at 00:35

## 2019-04-07 RX ADMIN — ONDANSETRON PRN MG: 2 INJECTION INTRAMUSCULAR; INTRAVENOUS at 02:48

## 2019-04-07 RX ADMIN — SODIUM CHLORIDE SCH UNITS: 4.5 INJECTION, SOLUTION INTRAVENOUS at 08:09

## 2019-04-07 NOTE — IPN
DATE:  04/07/2019

 

Mrs. Justin has not done very well overnight.  She got a lot of hydration and her

blood pressure ultimately improved, but unfortunately, she is at this point

essentially anuric.  She made about 120 mL of urine yesterday, so far she has not

made any urine today.  She has been agitated most of the night.  She remains in

atrial fibrillation with variable heart rate.

 

Vital signs this morning, blood pressure 92/50, heart rate on average about 110s,

afebrile.  Saturation 92% on room air.  Weight 45.1 kg.  She is not alert,

oriented at this point.  I did not do a full physical exam because patient

apparently was agitated all night and only now she was able to fall asleep.

 

Laboratory wise:  CBC reveals hemoglobin 14.9, hematocrit 45 and platelet count

179,000.  Basic metabolic panel:  Sodium 141, potassium 5.3, BUN 74, creatinine

2.5, and glucose 90.

 

ASSESSMENT AND PLAN:  Mrs. Justin is an 89-year-old female, who has underlying

dementia and known valvular heart disease that include severe aortic stenosis and

severe mitral insufficiency.  She presented acutely dehydrated state with atrial

fibrillation and rapid ventricular response (RVR), was profoundly hypotensive

that eventually was corrected by vigorous hydration.  She received over 3 liters

of fluid yesterday, but unfortunately she developed acute renal failure, and at

this point, in my opinion will not survive this situation without dialysis as she

remains anuric.  I had a discussion with her son and daughter-in-law at bedside.

Apparently it was consistent with her wishes that she did not want any heroic

interventions and decision was made to make Mrs. Justin comfort care only.  I

believe that it is in her best interest as she has underlying uncorrectable

problem.

## 2019-04-07 NOTE — IPNPDOC
Date Seen


The patient was seen on 4/7/19.





Progress Note


SUBJECTIVE: This is a 89 years old, white female with past medical history of 

severe dementia, severe aortic stenosis, mitral regurgitation: not a surgical 

candidate, afib and pulmonary hypertension, who was brought to the ER with chief

complaints of nausea, vomiting and abdominal pain along with loose bowel 

movements since last 2 days. She was to be in afib with rvr, s/p ivf 3L ns , but

has been oliguric /anuric, TRAY and signs of possible liver failure as well 

(transaminitis). She is more confused than her baseline per daughter at bedside.

This morning when I saw her , her daughter was out the door speaking with the 

nurse and I and when we got back inn the room, she was bleeding from an IV 

access she pulled out. SHe was also trying to get out of bed even though 

complaining that she was dizzy. Her bp has been also running low. The daughter 

expressed that the patient is DNR/DNI and they are thinking of making her 

comfort care, but they wanted to speak with Dr. Brown before making a decision.

After discussion with Dr. Brown they decided to make her comfort care as well. 





OBJECTIVE


PHYSICAL EXAMINATION:


VITAL SIGNS: Please see below. 


Physical exam 


Gen: NAD, thin and confused,  


HEENT: normocephalic, atraumatic, no discharge from ears or nose, no 

oropharyngeal erythema or exudate, neck is supple, no lymphadenopathy, trachea 

midline 


CVS: RRR, normal S1n S2, systoilic murmur in 2nd intercostal space,  rubs, or 

gallops, no edema, no jvd 


Resp: LCTAB, no rhonchi, wheezes or crackles 


Abd : soft nontender, normal bowel sounds, no rebound tenderness or guarding 


MSK: no swelling,generalized weakness , but moves extremities without any 

difficulties or signs of deficits


Neuro: + confusion, no focal deficit 


Psych: mood swings between agitation and fatigue, poor judgment





LABORATORY DATA, IMAGING STUDIES, MICROBIOLOGY: Please see below.- reviewed 





ASSESSMENT AND PLAN: 


Severe dementia// severe aortic stenosis// mitral regurgitation// afib// 

pulmonary hypertension//TRAY//transaminitis 


comfort care measures 


DNR/dni


comfort measure orders are in 


allow patient to have a liberated diet- eat whatever she wants 


ok without IV access


no blood draws unless family requests 


dc iv meds and other necessary meds 








DISPOSITION: inpatient hospice care -


prognosis  - poor





VS, I&O, 24H, Fishbone


Vital Signs/I&O





Vital Signs








  Date Time  Temp Pulse Resp B/P (MAP) Pulse Ox O2 Delivery O2 Flow Rate FiO2


 


4/7/19 08:00 97.7 144 18 92/50 (64) 92   


 


4/6/19 15:26      Room Air  














I&O- Last 24 Hours up to 6 AM 


 


 4/7/19





 06:00


 


Intake Total 3350 ml


 


Output Total 120 ml


 


Balance 3230 ml











Laboratory Data


24H LABS


Laboratory Tests 2


4/6/19 14:00: 


Anion Gap 11, Glomerular Filtration Rate 26.2L, Lactic Acid Level 3.5*H, Blood 

Urea Nitrogen 63H, Creatinine 1.92H, Sodium Level 142, Potassium Level 4.7, 

Chloride Level 106, Carbon Dioxide Level 25, Calcium Level 8.9, Magnesium Level 

2.6H


4/6/19 18:36: 


Lactic Acid Followup at 4 Hours 3.1*H, Troponin I 0.12H


4/6/19 22:42: Troponin I 0.06#


4/7/19 05:06: Nucleated Red Blood Cells % (auto) 1.4H


4/7/19 05:08: 


Anion Gap 12, Glomerular Filtration Rate 19.7L, Blood Urea Nitrogen 74H, 

Creatinine 2.46H, Sodium Level 141, Potassium Level 5.3H, Chloride Level 108H, 

Carbon Dioxide Level 21, Calcium Level 8.3L, Aspartate Amino Transf (AST/SGOT) 

493H, Alanine Aminotransferase (ALT/SGPT) 566H, Alkaline Phosphatase 171H, Total

Bilirubin 3.3H, Total Protein 5.7L, Albumin 2.6L, Magnesium Level 2.5H, 

Albumin/Globulin Ratio 0.84L, Lipase 468H


CBC/BMP


Laboratory Tests


4/6/19 14:00








Calcium Level 8.9





4/7/19 05:06








Red Blood Count 4.92, Mean Corpuscular Volume 93.3, Mean Corpuscular Hemoglobin 

30.3, Mean Corpuscular Hemoglobin Concent 32.5, Red Cell Distribution Width 14.3





4/7/19 05:08








Calcium Level 8.3 L, Aspartate Amino Transf (AST/SGOT) 493 H, Alanine 

Aminotransferase (ALT/SGPT) 566 H, Alkaline Phosphatase 171 H, Total Bilirubin 

3.3 H, Total Protein 5.7 L, Albumin 2.6 L


Microbiology





Microbiology


4/6/19 Blood Culture - Preliminary, Resulted


         No growth after 24 hours . All specim...


4/6/19 Blood Culture - Preliminary, Resulted


         No growth after 24 hours . All specim...











ALEXX PINON MD                Apr 7, 2019 13:25

## 2019-04-08 RX ADMIN — MORPHINE SULFATE PRN MG: 10 SOLUTION ORAL at 05:59

## 2019-04-08 RX ADMIN — MORPHINE SULFATE PRN MG: 10 SOLUTION ORAL at 18:12

## 2019-04-08 RX ADMIN — MORPHINE SULFATE PRN MG: 10 SOLUTION ORAL at 14:11

## 2019-04-08 RX ADMIN — MORPHINE SULFATE PRN MG: 10 SOLUTION ORAL at 23:54

## 2019-04-08 RX ADMIN — ONDANSETRON PRN MG: 4 TABLET, ORALLY DISINTEGRATING ORAL at 09:41

## 2019-04-08 RX ADMIN — ALUMINUM HYDROXIDE, MAGNESIUM HYDROXIDE, AND SIMETHICONE PRN ML: 200; 200; 20 SUSPENSION ORAL at 23:53

## 2019-04-08 RX ADMIN — ALUMINUM HYDROXIDE, MAGNESIUM HYDROXIDE, AND SIMETHICONE PRN ML: 200; 200; 20 SUSPENSION ORAL at 12:10

## 2019-04-08 RX ADMIN — ONDANSETRON PRN MG: 4 TABLET, ORALLY DISINTEGRATING ORAL at 14:08

## 2019-04-08 RX ADMIN — MORPHINE SULFATE PRN MG: 10 SOLUTION ORAL at 09:41

## 2019-04-08 RX ADMIN — ALUMINUM HYDROXIDE, MAGNESIUM HYDROXIDE, AND SIMETHICONE PRN ML: 200; 200; 20 SUSPENSION ORAL at 16:18

## 2019-04-08 NOTE — IPNPDOC
Date Seen


The patient was seen on 4/8/19.





Progress Note


SUBJECTIVE: This is a 89 years old, white female with past medical history of 

severe dementia, severe aortic stenosis, mitral regurgitation: not a surgical 

candidate, afib and pulmonary hypertension, who was brought to the ER with chief

complaints of nausea, vomiting and abdominal pain along with loose bowel 

movements since last 2 days. She was to be in afib with rvr, s/p ivf 3L ns , but

has been oliguric /anuric, TRAY and signs of possible liver failure as well 

(transaminitis). She is more confused than her baseline per daughter at bedside.

Patient is dnr/dni and comfort care only, - no tube feed, no abx, no blood 

draws, no ivf- molst form in chart.





This morning she was complaining of epigastric pain and gagging as if she wants 

to vomit, but did not vomit. per daughter this is chronic for many yrs





OBJECTIVE


PHYSICAL EXAMINATION:


VITAL SIGNS: Please see below. 


Gen: NAD, thin and confused,  


HEENT: normocephalic, atraumatic, no discharge from ears or nose, neck is 

supple,  


CVS: irreg RR, normal S1n S2, systoilic murmur in 2nd intercostal space,  rubs, 

or gallops, no edema, no jvd 


Resp: LCTAB, no rhonchi, wheezes or crackles 


Abd : soft, epigastric tender, normal bowel sounds, no rebound tenderness or 

guarding 


MSK: no swelling,generalized weakness , but moves extremities without any 

difficulties or signs of deficits


Neuro: + confusion, no focal deficit 


Psych: mood swings between agitation and fatigue, poor judgment





LABORATORY DATA, IMAGING STUDIES, MICROBIOLOGY: Please see below.- reviewed 





ASSESSMENT AND PLAN: 


Severe dementia// severe aortic stenosis// mitral regurgitation// afib// 

pulmonary hypertension//TRAY//transaminitis 


comfort care measures 


DNR/dni


allow patient to have a liberated diet- eat whatever she wants 


ok without IV access


no blood draws unless family requests 


dc iv meds and other necessary meds 


added maalox prn for epigastric pain 





DISPOSITION: inpatient hospice care -


prognosis  - poor





VS, I&O, 24H, Fishbone


Vital Signs/I&O





Vital Signs








  Date Time  Temp Pulse Resp B/P (MAP) Pulse Ox O2 Delivery O2 Flow Rate FiO2


 


4/8/19 14:41   18     


 


4/7/19 14:58 98.1 92  88/52 (64) 91   


 


4/6/19 15:26      Room Air  














I&O- Last 24 Hours up to 6 AM 


 


 4/8/19





 06:00


 


Intake Total 1020 ml


 


Output Total 0 ml


 


Balance 1020 ml











Laboratory Data


Microbiology





Microbiology


4/6/19 Blood Culture - Preliminary, Resulted


         No Growth after 48 hours. All Specime...


4/6/19 Blood Culture - Preliminary, Resulted


         No Growth after 48 hours. All Specime...











ALEXX PINON MD                Apr 8, 2019 18:10

## 2019-04-08 NOTE — ECGEPIP
Stationary ECG Study

                           [facility] - ED

                                       

                                       Test Date:    2019

Pat Name:     ROBERT LORWY            Department:   

Patient ID:   [id number]                 Room:         -

Gender:       F                        Technician:   

:          [id number]               Requested By: Katharine Raines 

Order Number: [id number]     Reading MD:   Marc Woodruff

                                 Measurements

Intervals                              Axis          

Rate:         106                      P:            

NV:           0                        QRS:          -71

QRSD:         146                      T:            105

QT:           392                                    

QTc:          521                                    

                           Interpretive Statements

ATRIAL FIBRILLATION WITH RAPID VENTRICULAR RESPONSE

MARKED LEFT AXIS DEVIATION

LEFT BUNDLE BRANCH BLOCK, NEW COMPARED TO 17

RHYTHM/RATE CHANGE COMPARED TO 17

 

Electronically Signed On 2019 5:35:14 EDT by Marc Woodruff

## 2019-04-09 VITALS — DIASTOLIC BLOOD PRESSURE: 55 MMHG | SYSTOLIC BLOOD PRESSURE: 75 MMHG

## 2019-04-09 RX ADMIN — ONDANSETRON PRN MG: 4 TABLET, ORALLY DISINTEGRATING ORAL at 02:48

## 2019-04-09 RX ADMIN — MORPHINE SULFATE PRN MG: 10 SOLUTION ORAL at 16:25

## 2019-04-09 RX ADMIN — MORPHINE SULFATE PRN MG: 10 SOLUTION ORAL at 03:14

## 2019-04-09 RX ADMIN — MORPHINE SULFATE PRN MG: 10 SOLUTION ORAL at 13:59

## 2019-04-09 RX ADMIN — MORPHINE SULFATE PRN MG: 10 SOLUTION ORAL at 09:22

## 2019-04-09 RX ADMIN — MORPHINE SULFATE PRN MG: 10 SOLUTION ORAL at 11:51

## 2019-04-10 RX ADMIN — MORPHINE SULFATE PRN MG: 10 SOLUTION ORAL at 15:59

## 2019-04-10 RX ADMIN — MORPHINE SULFATE PRN MG: 10 SOLUTION ORAL at 23:14

## 2019-04-10 RX ADMIN — MORPHINE SULFATE PRN MG: 10 SOLUTION ORAL at 11:15

## 2019-04-10 RX ADMIN — MORPHINE SULFATE PRN MG: 10 SOLUTION ORAL at 18:45

## 2019-04-10 RX ADMIN — MORPHINE SULFATE PRN MG: 10 SOLUTION ORAL at 09:15

## 2019-04-10 RX ADMIN — MORPHINE SULFATE PRN MG: 10 SOLUTION ORAL at 13:23

## 2019-04-10 NOTE — IPNPDOC
Subjective


Date Seen


The patient was seen on 4/10/19.





Subjective


Chief Complaint/HPI


Subjective:


89 F with PMH dementia, severe aortic stenosis, MR, AFib brought in to due to 

nausea, vomiting and abdominal pain. patient continued to be in Afib w/ RVR with

TRAY worsening confusion. Family made DNR/DNI with comfort care only. 





Interval history:


Patient was moaning and groaning currently receiving pain medications for 

comfort care. No labs were drawn and not receiving any IV antibiotics at this 

time. Family at bedside, trying to keep patient comfortable.





Objective


Physical Examination


Eye Exam:  Positive: Conjunctiva & lids normal, EOMI


Neck Exam:  Positive: Supple, JVD


Chest Exam:  Positive: Other (bilateral basilar crackles audible)


Heart Exam:  Positive: Tachycardic, Murmurs


Telemetry:  Positive: Sinus, SV Tach, PVCs, PACs


Abdomen Exam:  Positive: Normal bowel sounds, Soft


Extremity Exam:  Positive: Clubbing, Cyanosis, Edema


Other physical findings


General: Not fully alert, responsive but moan and groans


Eyes: Normal sclera, MANDEEP


HENT: Atraumatic, neck supple


Cardiovascular: Normal S1/S2. 


Pulmonary: Decrease breath sounds. 


GI: Soft, nondistended


Skin: Warm and dry


Neuro: does not follow commands, unable to fully assess.





Assessment /Plan


Assessment


ASSESSMENT AND PLAN: 


Severe dementia // severe aortic stenosis // mitral regurgitation// afib// pul

monary hypertension//TRAY//transaminitis 


Currently DNR/DNI 


Comfort measures only


Pain Control. No blood draws, vital signs or IV Abx. 





Disposition: Inpatient hospice.





Problems





(1) TRAY (acute kidney injury)


Status:  Acute


Problem Text:  89 years old, white female with past medical history of multiple 

medical problems. She is DNR as per patient's daughter also has a history of 

dementia, aortic stenosis, mitral regurgitation, not a surgical candidate for 

cardiac procedures in the past. Pulmonary hypertension is being admitted 

secondary to high BUN/creatinine and high potassium.


Also, patient was found to have a high lactate 4.4 without any source of 

infection.


Patient developed crampy abdominal pains, nausea, vomiting since 2 days ago


The patient to [facility] with telemetry monitoring


Strict I and O's


Continuous pulse ox


Cardiac telemetry monitoring


O2 support


Continue home meds


Nothing by mouth except meds


Continue IV fluids normal saline at 100 mL per hour with close observation 

secondary to history of diastolic heart failure and severe aortic stenosis in 

this patient


Patient is hypotensive with tachyarrhythmias. Eyes discussed with Dr. vogel, he

will evaluate patient in ED and recommend any further cardiac recommendations


Discussed with patient's daughter, patient is DNR. We will continue patient's 

home medication. In the meantime, we will closely follow patient and manage 

treatment accordingly








Plan/VTE


VTE Prophylaxis Ordered?:  Yes





VS, I&O, 24H, Fishbone


Vital Signs/I&O





Vital Signs








  Date Time  Temp Pulse Resp B/P (MAP) Pulse Ox O2 Delivery O2 Flow Rate FiO2


 


4/9/19 16:55   14     


 


4/9/19 14:00 96.7 104  75/55 (62) 96   


 


4/6/19 15:26      Room Air  














I&O- Last 24 Hours up to 6 AM 


 


 4/10/19





 06:00


 


Intake Total 0 ml


 


Output Total 0 ml


 


Balance 0 ml











Laboratory Data


Microbiology





Microbiology


4/6/19 Blood Culture - Preliminary, Resulted


         No Growth after 72 hours. All specime...


4/6/19 Blood Culture - Preliminary, Resulted


         No Growth after 72 hours. All specime...











MARK KING MD                Apr 10, 2019 18:47

## 2019-04-11 RX ADMIN — MORPHINE SULFATE PRN MG: 10 SOLUTION ORAL at 01:26

## 2019-04-11 RX ADMIN — MORPHINE SULFATE PRN MG: 10 SOLUTION ORAL at 03:28

## 2019-04-11 NOTE — DS.PDOC
Discharge Summary


General


Date of Admission


Apr 6, 2019 at 12:00


Date of Discharge


4/11/19





Discharge Summary


Death Summary:





Time of death: 05:28 AM on 4/11/19





Asians an 89-year-old female with past medical history of severe dementia, 

severe aortic stenosis and mitral regurgitation presented to ER with complaints 

of nausea, vomiting abdominal pain along with loose bowel movements for 2 days. 

Patient was noted to become more lethargic over the past days and has been 

significantly decrease in by mouth intake and vomits. Patient is found to be 

hypotensive in the ER and remained hypotensive. Due to patient's worsening 

clinical status and advanced age in setting of dementia, no aggressive 

interventions were performed per family's wishes. Patient was made comfort care 

only with pain medication as needed for comfort. Patient passed away early this 

morning at 5:28 AM.





Vital Signs/I&Os





Vital Signs








  Date Time  Temp Pulse Resp B/P (MAP) Pulse Ox O2 Delivery O2 Flow Rate FiO2


 


4/9/19 16:55   14     


 


4/9/19 14:00 96.7 104  75/55 (62) 96   


 


4/6/19 15:26      Room Air  














I&O- Last 24 Hours up to 6 AM 


 


 4/11/19





 06:00


 


Intake Total 0 ml


 


Output Total 0 ml


 


Balance 0 ml











Microbiology





Microbiology


4/6/19 Blood Culture - Final, Complete


         NO GROWTH AFTER 5 DAYS


4/6/19 Blood Culture - Final, Complete


         NO GROWTH AFTER 5 DAYS





Discharge Medications


Scheduled


Aspirin (Aspirin EC) 81 Mg Tab, 81 MG PO DAILY, (Reported)


Dorzolamide HCl/Timolol Maleat (Dorzolamide-Timolol Eye Drops) 1 Sol Sol, 1 DROP

OU BID, (Reported)


Lactase (Dairy Relief) 3,000 Unit Tab, 3,000 UNIT PO QPM, (Reported)


Latanoprost (Xalatan) 0.005 % Sol, 1 DROP OU QHS, (Reported)


Levothyroxine Sodium (Synthroid) 25 Mcg Tab, 25 MCG PO DAILY, (Reported)


Multivitamins (Thera M Plus Tablet) 1 Tab Tab, 1 TAB PO DAILY, (Reported)


Omeprazole (Omeprazole) 20 Mg Cap, 20 MG PO BID, (Reported)


Torsemide (Torsemide) 20 Mg Tab, 40 MG PO DAILY, (Reported)





Scheduled PRN


Torsemide (Torsemide) 20 Mg Tab, 20 MG PO QPM PRN for SWELLING, (Reported)





Allergies


Coded Allergies:  


     bee venom protein (honey bee) (Verified  Allergy, Severe, SWOLLEN, 4/6/19)


     pregabalin (Verified  Adverse Reaction, Intermediate, PARANOIA, 4/6/19)











MARK KING MD                Apr 11, 2019 18:56